# Patient Record
Sex: MALE | Race: WHITE | NOT HISPANIC OR LATINO | Employment: OTHER | URBAN - METROPOLITAN AREA
[De-identification: names, ages, dates, MRNs, and addresses within clinical notes are randomized per-mention and may not be internally consistent; named-entity substitution may affect disease eponyms.]

---

## 2018-11-06 ENCOUNTER — TELEPHONE (OUTPATIENT)
Dept: PULMONOLOGY | Facility: CLINIC | Age: 73
End: 2018-11-06

## 2018-11-28 RX ORDER — LOSARTAN POTASSIUM 100 MG/1
100 TABLET ORAL DAILY
COMMUNITY

## 2018-11-28 RX ORDER — ATORVASTATIN CALCIUM 80 MG/1
80 TABLET, FILM COATED ORAL DAILY
COMMUNITY

## 2018-11-28 RX ORDER — LOSARTAN POTASSIUM 50 MG/1
50 TABLET ORAL DAILY
COMMUNITY

## 2018-11-28 RX ORDER — FLUTICASONE PROPIONATE 50 MCG
1 SPRAY, SUSPENSION (ML) NASAL DAILY
COMMUNITY

## 2018-11-28 RX ORDER — FUROSEMIDE 40 MG/1
40 TABLET ORAL 2 TIMES DAILY
COMMUNITY
End: 2019-07-09

## 2018-11-28 RX ORDER — MELOXICAM 15 MG/1
15 TABLET ORAL DAILY
COMMUNITY
End: 2019-07-09

## 2018-11-28 RX ORDER — METOPROLOL SUCCINATE 50 MG/1
50 TABLET, EXTENDED RELEASE ORAL DAILY
COMMUNITY

## 2018-11-28 RX ORDER — ZOLPIDEM TARTRATE 10 MG/1
5 TABLET ORAL NIGHTLY PRN
COMMUNITY
End: 2019-07-09

## 2018-11-28 RX ORDER — AMLODIPINE BESYLATE 10 MG/1
10 TABLET ORAL DAILY
COMMUNITY

## 2018-11-28 RX ORDER — BUMETANIDE 1 MG/1
3 TABLET ORAL 2 TIMES DAILY
COMMUNITY

## 2018-11-28 RX ORDER — BUDESONIDE AND FORMOTEROL FUMARATE DIHYDRATE 160; 4.5 UG/1; UG/1
2 AEROSOL RESPIRATORY (INHALATION) EVERY 12 HOURS
COMMUNITY
End: 2023-09-19

## 2018-11-28 RX ORDER — NIFEDIPINE 60 MG/1
30 TABLET, EXTENDED RELEASE ORAL DAILY
COMMUNITY

## 2018-11-28 RX ORDER — POTASSIUM CHLORIDE 750 MG/1
10 TABLET, EXTENDED RELEASE ORAL 2 TIMES DAILY
COMMUNITY

## 2018-11-28 RX ORDER — CLOPIDOGREL BISULFATE 75 MG/1
75 TABLET ORAL DAILY
COMMUNITY

## 2018-11-28 RX ORDER — TERAZOSIN 2 MG/1
2 CAPSULE ORAL NIGHTLY
COMMUNITY

## 2018-11-29 ENCOUNTER — OFFICE VISIT (OUTPATIENT)
Dept: PULMONOLOGY | Facility: CLINIC | Age: 73
End: 2018-11-29
Payer: COMMERCIAL

## 2018-11-29 VITALS
SYSTOLIC BLOOD PRESSURE: 130 MMHG | OXYGEN SATURATION: 96 % | HEIGHT: 75 IN | BODY MASS INDEX: 39.17 KG/M2 | WEIGHT: 315 LBS | HEART RATE: 73 BPM | DIASTOLIC BLOOD PRESSURE: 82 MMHG

## 2018-11-29 DIAGNOSIS — G47.33 OSA (OBSTRUCTIVE SLEEP APNEA): ICD-10-CM

## 2018-11-29 DIAGNOSIS — J44.9 CHRONIC OBSTRUCTIVE PULMONARY DISEASE, UNSPECIFIED COPD TYPE: ICD-10-CM

## 2018-11-29 DIAGNOSIS — J96.11 CHRONIC HYPOXEMIC RESPIRATORY FAILURE: ICD-10-CM

## 2018-11-29 PROCEDURE — 99214 OFFICE O/P EST MOD 30 MIN: CPT | Mod: ,,, | Performed by: INTERNAL MEDICINE

## 2018-11-29 NOTE — PATIENT INSTRUCTIONS
Chronic Lung Disease: Preventing Lung Infections  Chronic lung diseases include chronic obstructive pulmonary disease (COPD), which includes chronic bronchitis and emphysema. Other chronic lung diseases include pulmonary fibrosis, sarcoidosis, and other conditions. When you have chronic lung diseases, it's very important to protect yourself from respiratory infections, like colds, the flu, and lung infections. Infections may cause your lung condition to worsen. Although you can't completely avoid them, there are things you can do to lessen the chance of infections.    Take precautions  Taking the following precautions can help you avoid illness:  · Remember to keep your hands away from your nose and mouth. Germs on your hands get into your respiratory system this way.  · Wash your hands often. When you wash them:  ¨ Use soap and warm water.  ¨ Rub your hands together well for at least 20 seconds.  ¨ Make sure to rinse them well.  ¨ Dry your hands on clean towels or air-dry them.  · Use hand  containing alcohol, if you are unable to wash your hands. Use the  after touching doorknobs, handles, and supermarket carts, for example, since lots of people touch them. Then wash your hands as soon as you can.  · To help prevent the flu, get a flu vaccination every year. This may be given at your healthcare provider's office, a drugstore, or pharmacy, or at work. Get your flu shot as soon as the vaccines are available in your area. This is usually around September each year.  · To help prevent pneumococcal pneumonia, get pneumonia vaccinations. Talk with your healthcare provider about which pneumococcal vaccinations you need.  · Try to stay away from people with respiratory infections, such as colds or the flu. Stay away from crowded places, like shopping centers or movie theatres during cold and flu season.  · If you smoke, think about quitting. In addition to causing or worsening many lung conditions, the  lung damage from smoking increases your risk of infections. Stay away from others who smoke, too. This is also harmful and increases your chance of infections.  Date Last Reviewed: 4/14/2016  © 8957-6390 [employer]. [address] [place] [address], [place] [address]. All rights reserved. This information is not intended as a substitute for professional medical care. Always follow your healthcare professional's instructions.      Continue to use your Symbicort two puffs twice a day and Combivent three times a day  Oxygen all the time  Bipap with sleep, bleed oxygen in   Call if you get sick  Already had his flu shot   Pulmonary rehab

## 2018-11-29 NOTE — LETTER
November 29, 2018      [facility]  [address]  [place] [address]           [facility] - Pulmonology  [address] [facility] [address] [place] [address]  Phone: [phone]  Fax: [phone]          Patient: Jordy Jeffers   MR Number: [id number]   YOB: 1945   Date of Visit: 11/29/2018       Dear [facility]:    Thank you for referring Jordy Jeffers to me for evaluation. Attached you will find relevant portions of my assessment and plan of care.    If you have questions, please do not hesitate to call me. I look forward to following Jordy Jeffers along with you.    Sincerely,    Martha Dee MD    Enclosure  CC:  No Recipients    If you would like to receive this communication electronically, please contact [email or web address] or [phone] to request more information on [employer] Link access.    For providers and/or their staff who would like to refer a patient to [facility], please contact us through our one-stop-shop provider referral line, [facility], at [phone].    If you feel you have received this communication in error or would no longer like to receive these types of communications, please e-mail [email or web address]

## 2018-11-29 NOTE — PROGRESS NOTES
SUBJECTIVE:    Patient ID: Jordy Jeffers is a 73 y.o. male.    Chief Complaint: Follow-up    HPI   Patient here today feeling well.  He was very ill in August with an infection. He was hospitalized for 16 days and required IV antibiotics for a month.  He developed an ulceration on his scrotum which has healed.  While he was in the hospital he was not able to use his own BIPAP at the hospital for some reason.  He is wearing his oxygen all the time.  He is using his Symbicort and Combivent.    Past Medical History:   Diagnosis Date    CAD (coronary artery disease)     Chronic hypoxemic respiratory failure     Colon polyps     COPD (chronic obstructive pulmonary disease)     Diastolic heart failure     Diverticulosis     DM (diabetes mellitus)     HTN (hypertension)     Hypercholesterolemia     Lung disease     Obesity     SYBIL (obstructive sleep apnea)      Past Surgical History:   Procedure Laterality Date    coronary artery stent      r cataract resected      warfin tumor removed        Family History   Problem Relation Age of Onset    Heart failure Mother     Coronary artery disease Father         Social History:   Marital Status:   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  Tobacco History:  reports that he quit smoking about 15 years ago. His smoking use included cigarettes. He has a 60.00 pack-year smoking history. he has never used smokeless tobacco.  Drug History:  reports that he does not use drugs.    Review of patient's allergies indicates:   Allergen Reactions    Metformin        Current Outpatient Medications   Medication Sig Dispense Refill    amLODIPine (NORVASC) 10 MG tablet Take 10 mg by mouth once daily.      atorvastatin (LIPITOR) 80 MG tablet Take 80 mg by mouth once daily.      budesonide-formoterol 160-4.5 mcg (SYMBICORT) 160-4.5 mcg/actuation HFAA Inhale 2 puffs into the lungs every 12 (twelve) hours. Controller      bumetanide  "(BUMEX) 1 MG tablet Take 3 mg by mouth 2 (two) times daily.       clopidogrel (PLAVIX) 75 mg tablet Take 75 mg by mouth once daily.      fluticasone (FLONASE) 50 mcg/actuation nasal spray 1 spray by Each Nare route once daily.      ipratropium-albuterol (COMBIVENT RESPIMAT)  mcg/actuation inhaler Inhale 1 puff into the lungs every 6 (six) hours as needed for Wheezing. Rescue      losartan (COZAAR) 100 MG tablet Take 100 mg by mouth once daily.      metoprolol succinate (TOPROL-XL) 200 MG 24 hr tablet Take 200 mg by mouth once daily.      NIFEdipine (ADALAT CC) 60 MG TbSR Take 30 mg by mouth once daily.      potassium chloride (KLOR-CON) 10 MEQ TbSR Take 10 mEq by mouth once.      terazosin (HYTRIN) 2 MG capsule Take 2 mg by mouth every evening.      furosemide (LASIX) 40 MG tablet Take 40 mg by mouth 2 (two) times daily.      losartan (COZAAR) 50 MG tablet Take 50 mg by mouth once daily.      meloxicam (MOBIC) 15 MG tablet Take 15 mg by mouth once daily.      zolpidem (AMBIEN) 10 mg Tab Take 5 mg by mouth nightly as needed.       No current facility-administered medications for this visit.        Last PFT: 12/28/2017  Last CT:    Review of Systems  General: Feeling Well.  Eyes: Vision is good.  ENT:  No sinusitis or pharyngitis.   Heart:: No chest pain or palpitations.  Lungs: breathing is stable   GI: No Nausea, vomiting, constipation, diarrhea, or reflux.  : No dysuria, hesitancy, or nocturia.  Musculoskeletal: No joint pain or myalgias.  Skin: No lesions or rashes.  Neuro: No headaches or neuropathy.  Lymph: swelling to legs  Psych: No anxiety or depression.  Endo: weight loss     OBJECTIVE:      /82 (BP Location: Left arm, Patient Position: Sitting)   Pulse 73   Ht 6' 3" (1.905 m)   Wt (!) 147 kg (324 lb)   SpO2 96% Comment: 4.0 oxygen level  BMI 40.50 kg/m²     Physical Exam  GENERAL: Older patient in no distress.  HEENT: Pupils equal and reactive. Extraocular movements intact. " "Nose intact.      Pharynx moist.  NECK: Supple.   HEART: Regular rate and rhythm. No murmur or gallop auscultated.  LUNGS: Clear to auscultation and percussion. Lung excursion symmetrical. No change in fremitus. No adventitial noises.  ABDOMEN: Bowel sounds present. Non-tender, no masses palpated.  EXTREMITIES: Normal muscle tone and joint movement, no cyanosis or clubbing.   LYMPHATICS: No adenopathy palpated, no edema.  SKIN: Dry, intact, no lesions.   NEURO: Cranial nerves II-XII intact. Motor strength 5/5 bilaterally, upper and lower extremities.  PSYCH: Appropriate affect.    Majo Denson NP served in the capacity as a "scribe" for this patient encounter.  A "face to face" encounter occurred with Dr. Dee on this date  The treatment plan and medical decision making is outlined below:         Assessment:       1. Chronic obstructive pulmonary disease, unspecified COPD type    2. SYBIL (obstructive sleep apnea)    3. Chronic hypoxemic respiratory failure          Plan:       Chronic obstructive pulmonary disease, unspecified COPD type  -     Ambulatory Referral to Pulmonary Rehab    SYBIL (obstructive sleep apnea)    Chronic hypoxemic respiratory failure    Continue to use your Symbicort two puffs twice a day and Combivent three times a day  Oxygen all the time  Bipap with sleep, bleed oxygen in   Call if you get sick  Already had his flu shot   Pulmonary rehab      Follow-up in about 6 months (around 5/29/2019).        "

## 2019-07-08 RX ORDER — CIPROFLOXACIN AND DEXAMETHASONE 3; 1 MG/ML; MG/ML
SUSPENSION/ DROPS AURICULAR (OTIC)
COMMUNITY
End: 2019-07-09

## 2019-07-08 RX ORDER — AMMONIUM LACTATE 12 G/100G
LOTION TOPICAL
COMMUNITY

## 2019-07-08 RX ORDER — BENZONATATE 100 MG/1
100 CAPSULE ORAL
COMMUNITY
End: 2021-06-30

## 2019-07-08 RX ORDER — AMOXICILLIN AND CLAVULANATE POTASSIUM 875; 125 MG/1; MG/1
TABLET, FILM COATED ORAL
COMMUNITY
End: 2019-07-09

## 2019-07-08 RX ORDER — ONDANSETRON 4 MG/1
4 TABLET, ORALLY DISINTEGRATING ORAL
COMMUNITY
Start: 2018-08-02 | End: 2019-07-09

## 2019-07-08 RX ORDER — INSULIN ASPART 100 [IU]/ML
10 INJECTION, SOLUTION INTRAVENOUS; SUBCUTANEOUS
COMMUNITY

## 2019-07-09 ENCOUNTER — TELEPHONE (OUTPATIENT)
Dept: PULMONOLOGY | Facility: CLINIC | Age: 74
End: 2019-07-09

## 2019-07-09 ENCOUNTER — OFFICE VISIT (OUTPATIENT)
Dept: PULMONOLOGY | Facility: CLINIC | Age: 74
End: 2019-07-09
Payer: COMMERCIAL

## 2019-07-09 VITALS
WEIGHT: 315 LBS | DIASTOLIC BLOOD PRESSURE: 60 MMHG | HEIGHT: 75 IN | SYSTOLIC BLOOD PRESSURE: 130 MMHG | BODY MASS INDEX: 39.17 KG/M2 | OXYGEN SATURATION: 95 % | HEART RATE: 88 BPM

## 2019-07-09 DIAGNOSIS — J44.9 CHRONIC OBSTRUCTIVE PULMONARY DISEASE, UNSPECIFIED COPD TYPE: Primary | ICD-10-CM

## 2019-07-09 DIAGNOSIS — J96.11 CHRONIC HYPOXEMIC RESPIRATORY FAILURE: ICD-10-CM

## 2019-07-09 DIAGNOSIS — G47.33 OSA (OBSTRUCTIVE SLEEP APNEA): ICD-10-CM

## 2019-07-09 PROCEDURE — 99214 OFFICE O/P EST MOD 30 MIN: CPT | Mod: ,,, | Performed by: NURSE PRACTITIONER

## 2019-07-09 PROCEDURE — 99214 PR OFFICE/OUTPT VISIT, EST, LEVL IV, 30-39 MIN: ICD-10-PCS | Mod: ,,, | Performed by: NURSE PRACTITIONER

## 2019-07-09 RX ORDER — SPIRONOLACTONE 25 MG/1
25 TABLET ORAL
COMMUNITY

## 2019-07-09 RX ORDER — PANTOPRAZOLE SODIUM 40 MG/1
40 TABLET, DELAYED RELEASE ORAL DAILY
COMMUNITY

## 2019-07-09 NOTE — PROGRESS NOTES
SUBJECTIVE:    Patient ID: Jordy Jeffers is a 74 y.o. male.    Chief Complaint: COPD    HPI   Patient here today with more weight gain.  He gets short of breath easily. He is using Symbicort twice a day and Combivent 3-4 times a day. [facility] not [facility] more then twice a day.  He wears his oxygen all the time. He sleeps on his BIPAP with oxygen bled in. He does not want to do pulmonary rehab.   Past Medical History:   Diagnosis Date    CAD (coronary artery disease)     Chronic hypoxemic respiratory failure     Colon polyps     COPD (chronic obstructive pulmonary disease)     Diastolic heart failure     Diverticulosis     DM (diabetes mellitus)     HTN (hypertension)     Hypercholesterolemia     Lung disease     Obesity     SYBIL (obstructive sleep apnea)      Past Surgical History:   Procedure Laterality Date    coronary artery stent      r cataract resected      warfin tumor removed        Family History   Problem Relation Age of Onset    Heart failure Mother     Coronary artery disease Father         Social History:   Marital Status:   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  Tobacco History:  reports that he quit smoking about 16 years ago. His smoking use included cigarettes. He has a 60.00 pack-year smoking history. He has never used smokeless tobacco.  Drug History:  reports that he does not use drugs.    Review of patient's allergies indicates:   Allergen Reactions    Metformin        Current Outpatient Medications   Medication Sig Dispense Refill    amLODIPine (NORVASC) 10 MG tablet Take 10 mg by mouth once daily.      atorvastatin (LIPITOR) 80 MG tablet Take 80 mg by mouth once daily.      benzonatate (TESSALON) 100 MG capsule Take 100 mg by mouth.      budesonide-formoterol 160-4.5 mcg (SYMBICORT) 160-4.5 mcg/actuation HFAA Inhale 2 puffs into the lungs every 12 (twelve) hours. Controller      bumetanide (BUMEX) 1 MG  "tablet Take 3 mg by mouth 2 (two) times daily.       clopidogrel (PLAVIX) 75 mg tablet Take 75 mg by mouth once daily.      fluticasone (FLONASE) 50 mcg/actuation nasal spray 1 spray by Each Nare route once daily.      insulin aspart U-100 (NOVOLOG) 100 unit/mL Crtg Inject 10 Units into the skin.      ipratropium-albuterol (COMBIVENT RESPIMAT)  mcg/actuation inhaler Inhale 1 puff into the lungs every 6 (six) hours as needed for Wheezing. Rescue      losartan (COZAAR) 50 MG tablet Take 50 mg by mouth once daily.      metoprolol succinate (TOPROL-XL) 200 MG 24 hr tablet Take 200 mg by mouth once daily.      pantoprazole (PROTONIX) 40 MG tablet Take 40 mg by mouth once daily.      potassium chloride (KLOR-CON) 10 MEQ TbSR Take 10 mEq by mouth once.      spironolactone (ALDACTONE) 25 MG tablet Take 25 mg by mouth.      terazosin (HYTRIN) 2 MG capsule Take 2 mg by mouth every evening.      ammonium lactate (LAC-HYDRIN) 12 % lotion by intratympanic route.      losartan (COZAAR) 100 MG tablet Take 100 mg by mouth once daily.      NIFEdipine (ADALAT CC) 60 MG TbSR Take 30 mg by mouth once daily.       No current facility-administered medications for this visit.        Last PFT: 12/28/2017      Review of Systems  General: good and bad days  Eyes: Vision is good.  ENT:  No sinusitis or pharyngitis.   Heart:: No chest pain or palpitations.  Lungs: gets more short of breath with heat,   GI: No Nausea, vomiting, constipation, diarrhea, or reflux.  : No dysuria, hesitancy, or nocturia.  Musculoskeletal: No joint pain or myalgias.  Skin: No lesions or rashes.  Neuro: No headaches or neuropathy.  Lymph: swelling to legs  Psych: No anxiety or depression.  Endo: weight gain     OBJECTIVE:      /60 (BP Location: Left arm, Patient Position: Sitting, BP Method: Large (Manual))   Pulse 88   Ht 6' 3" (1.905 m)   Wt (!) 158.8 kg (350 lb)   SpO2 95% Comment: 4LPM  BMI 43.75 kg/m²     Physical Exam  GENERAL: " Older patient in no distress.  HEENT: Pupils equal and reactive. Extraocular movements intact. Nose intact.      Pharynx moist.  NECK: Supple.   HEART: Regular rate and rhythm. No murmur or gallop auscultated.  LUNGS: Clear to auscultation and percussion. Lung excursion symmetrical. No change in fremitus. No adventitial noises.  ABDOMEN: Bowel sounds present. Non-tender, no masses palpated.  EXTREMITIES: Normal muscle tone and joint movement, no cyanosis or clubbing.   LYMPHATICS: 2+ pitting edema to legs   SKIN: Dry, intact, no lesions.   NEURO: Cranial nerves II-XII intact. Motor strength 5/5 bilaterally, upper and lower extremities.  PSYCH: Appropriate affect.        Assessment:       1. Chronic obstructive pulmonary disease, unspecified COPD type    2. Chronic hypoxemic respiratory failure    3. SYBIL (obstructive sleep apnea)          Plan:       Chronic obstructive pulmonary disease, unspecified COPD type  -     Complete PFT with bronchodilator    Chronic hypoxemic respiratory failure    SYBIL (obstructive sleep apnea)       Continue to use your Symbicort two puffs twice a day, rinse after you use  Start Spiriva 1 capsule daily  Proventil 2 puffs as needed every 4 hours for shortness of breath  Stop the Combivent   Oxygen all the time  Bipap with sleep, bleed oxygen in   Call if you get sick  PFT       Follow up in about 6 months (around 1/9/2020).

## 2019-07-09 NOTE — PATIENT INSTRUCTIONS
Treatment for COPD    Your healthcare provider will prescribe the best treatments for your COPD.  Treatment  Recommendations include the following:  · Medicines. Some medicines help relieve symptoms when you have them. Others are taken daily to control inflammation in the lungs. Always take your medicines as prescribed. Learn the names of your medicines, as well as how and when to use them.  · Oxygen therapy. Oxygen may be prescribed if tests show that your blood contains too little oxygen.  · Smoking. If you smoke, quit. Smoking is the main cause of COPD. Quitting will help you be able to better manage your COPD. Ask your healthcare provider about ways to help you quit smoking.  · Avoiding infections. Infections, like a cold or the flu, can cause your symptoms to worsen. Try to stay away from people who are sick. Wash your hands often. And, ask your healthcare provider about vaccines for the flu and pneumonia.  Coping with shortness of breath  Coping tips include the following:  · Exercise. Try to be as active as possible. This will improve energy levels and strengthen your muscles, so you can do more.  · Breathing techniques. Ask your healthcare provider or nurse to show you how to do pursed-lip breathing.  · Balance rest and activity. Each day, try to balance rest periods with activity. For example, you might start the day with getting dressed and eating breakfast, then relax and read the paper. After that, take a brief walk. And then sit with your feet up for a while.  · Pulmonary rehabilitation. Ask your provider, or call your local hospital to find out about pulmonary rehab programs. The programs help with managing your disease, breathing techniques, exercise, support and counseling.  · Healthy eating. Eating a healthy, balanced diet and making an effort to maintain your ideal weight are important to staying as healthy as possible. Make sure you have a lot of fruit and vegetables every day, as well as  balanced portions of whole grains, lean meats and fish, and low-fat dairy products.  Date Last Reviewed: 5/1/2016  © 7019-0944 [employer]. [address] [place] [address], [place] [address]. All rights reserved. This information is not intended as a substitute for professional medical care. Always follow your healthcare professional's instructions.    Continue to use your Symbicort two puffs twice a day   Start Spiriva 1 capsule daily  Proventil 2 puffs as needed every 4 hours for shortness of breath  Stop the Combivent   Oxygen all the time  Bipap with sleep, bleed oxygen in   Call if you get sick  PFT

## 2019-07-09 NOTE — TELEPHONE ENCOUNTER
----- Message from LAY Caba sent at 7/8/2019  4:52 PM CDT -----  Next available for new referral   ----- Message -----  From: Wilver Roth MA  Sent: 7/8/2019   3:50 PM  To: LAY Caba        ----- Message -----  From: Sandi Szymanski  Sent: 7/8/2019   2:50 PM  To: Janiya Kasper Staff    [facility] CONSULT TRANSMITTAL PULMONARY, 7/8/19

## 2019-07-09 NOTE — TELEPHONE ENCOUNTER
Patient is not a new patient. He has an appointment today, July 9, 2019. This is just his [facility] referral

## 2019-07-17 ENCOUNTER — TELEPHONE (OUTPATIENT)
Dept: PULMONOLOGY | Facility: CLINIC | Age: 74
End: 2019-07-17

## 2019-07-17 DIAGNOSIS — R06.00 DYSPNEA, UNSPECIFIED TYPE: Primary | ICD-10-CM

## 2019-07-17 NOTE — TELEPHONE ENCOUNTER
PFT's show severe obstruction, moderate restriction, severe diffusion defect. Good response in small airway.  PFTs are worse.

## 2019-07-18 NOTE — TELEPHONE ENCOUNTER
I spoke with patient and his wife, they are aware of results.  Will order chest xray to see if anything besides his weight is contributing to restriction.  He is using the symbicort and [facility] I [facility].  I also wrote for Tessalon for the patient to have just in case

## 2019-07-23 ENCOUNTER — DOCUMENTATION ONLY (OUTPATIENT)
Dept: PULMONOLOGY | Facility: CLINIC | Age: 74
End: 2019-07-23

## 2019-07-23 NOTE — PROGRESS NOTES
Chest x-ray done after last visit was unremarkable with the exception of some basilar atelectasis and scarring

## 2019-07-27 ENCOUNTER — TELEPHONE (OUTPATIENT)
Dept: PULMONOLOGY | Facility: CLINIC | Age: 74
End: 2019-07-27

## 2020-01-14 ENCOUNTER — OFFICE VISIT (OUTPATIENT)
Dept: PULMONOLOGY | Facility: CLINIC | Age: 75
End: 2020-01-14
Payer: COMMERCIAL

## 2020-01-14 VITALS
DIASTOLIC BLOOD PRESSURE: 80 MMHG | SYSTOLIC BLOOD PRESSURE: 140 MMHG | HEART RATE: 80 BPM | BODY MASS INDEX: 44 KG/M2 | OXYGEN SATURATION: 92 % | WEIGHT: 315 LBS

## 2020-01-14 DIAGNOSIS — J98.11 ATELECTASIS: ICD-10-CM

## 2020-01-14 DIAGNOSIS — J96.11 CHRONIC HYPOXEMIC RESPIRATORY FAILURE: ICD-10-CM

## 2020-01-14 DIAGNOSIS — J44.9 CHRONIC OBSTRUCTIVE PULMONARY DISEASE, UNSPECIFIED COPD TYPE: Primary | ICD-10-CM

## 2020-01-14 DIAGNOSIS — G47.33 OSA (OBSTRUCTIVE SLEEP APNEA): ICD-10-CM

## 2020-01-14 PROCEDURE — 99214 PR OFFICE/OUTPT VISIT, EST, LEVL IV, 30-39 MIN: ICD-10-PCS | Mod: S$GLB,,, | Performed by: NURSE PRACTITIONER

## 2020-01-14 PROCEDURE — 99214 OFFICE O/P EST MOD 30 MIN: CPT | Mod: S$GLB,,, | Performed by: NURSE PRACTITIONER

## 2020-01-14 PROCEDURE — 1159F MED LIST DOCD IN RCRD: CPT | Mod: S$GLB,,, | Performed by: NURSE PRACTITIONER

## 2020-01-14 PROCEDURE — 1126F AMNT PAIN NOTED NONE PRSNT: CPT | Mod: S$GLB,,, | Performed by: NURSE PRACTITIONER

## 2020-01-14 PROCEDURE — 1126F PR PAIN SEVERITY QUANTIFIED, NO PAIN PRESENT: ICD-10-PCS | Mod: S$GLB,,, | Performed by: NURSE PRACTITIONER

## 2020-01-14 PROCEDURE — 1159F PR MEDICATION LIST DOCUMENTED IN MEDICAL RECORD: ICD-10-PCS | Mod: S$GLB,,, | Performed by: NURSE PRACTITIONER

## 2020-01-14 RX ORDER — TIOTROPIUM BROMIDE 18 UG/1
1 CAPSULE ORAL; RESPIRATORY (INHALATION) DAILY
Qty: 90 CAPSULE | Refills: 6 | Status: SHIPPED | OUTPATIENT
Start: 2020-01-14 | End: 2021-01-13

## 2020-01-14 NOTE — LETTER
January 14, 2020      [facility]  [address] [facility] [address]           [facility] - Pulmonology  [address]  [facility] [address]  Phone: [phone]  Fax: [phone]          Patient: Jordy Jeffers   MR Number: [id number]   YOB: 1945   Date of Visit: 1/14/2020       Dear [facility]:    Thank you for referring Jordy Jeffers to me for evaluation. Attached you will find relevant portions of my assessment and plan of care.    If you have questions, please do not hesitate to call me. I look forward to following Jordy Jeffers along with you.    Sincerely,    Majo Denson, [facility]    Enclosure  CC:  No Recipients    If you would like to receive this communication electronically, please contact [email or web address] or [phone] to request more information on [employer] Link access.    For providers and/or their staff who would like to refer a patient to [facility], please contact us through our one-stop-shop provider referral line, [facility], at [phone].    If you feel you have received this communication in error or would no longer like to receive these types of communications, please e-mail [email or web address]

## 2020-01-14 NOTE — PATIENT INSTRUCTIONS
Treatment for COPD    Your healthcare provider will prescribe the best treatments for your COPD.  Treatment  Recommendations include the following:  · Medicines. Some medicines help relieve symptoms when you have them. Others are taken daily to control inflammation in the lungs. Always take your medicines as prescribed. Learn the names of your medicines, as well as how and when to use them.  · Oxygen therapy. Oxygen may be prescribed if tests show that your blood contains too little oxygen.  · Smoking. If you smoke, quit. Smoking is the main cause of COPD. Quitting will help you be able to better manage your COPD. Ask your healthcare provider about ways to help you quit smoking.  · Avoiding infections. Infections, like a cold or the flu, can cause your symptoms to worsen. Try to stay away from people who are sick. Wash your hands often. And, ask your healthcare provider about vaccines for the flu and pneumonia.  Coping with shortness of breath  Coping tips include the following:  · Exercise. Try to be as active as possible. This will improve energy levels and strengthen your muscles, so you can do more.  · Breathing techniques. Ask your healthcare provider or nurse to show you how to do pursed-lip breathing.  · Balance rest and activity. Each day, try to balance rest periods with activity. For example, you might start the day with getting dressed and eating breakfast, then relax and read the paper. After that, take a brief walk. And then sit with your feet up for a while.  · Pulmonary rehabilitation. Ask your provider, or call your local hospital to find out about pulmonary rehab programs. The programs help with managing your disease, breathing techniques, exercise, support and counseling.  · Healthy eating. Eating a healthy, balanced diet and making an effort to maintain your ideal weight are important to staying as healthy as possible. Make sure you have a lot of fruit and vegetables every day, as well as  balanced portions of whole grains, lean meats and fish, and low-fat dairy products.  Date Last Reviewed: 5/1/2016  © 2916-5570 [employer], [employer]. [address] [place] [address], [place] [address]. All rights reserved. This information is not intended as a substitute for professional medical care. Always follow your healthcare professional's instructions.

## 2020-01-14 NOTE — PROGRESS NOTES
SUBJECTIVE:    Patient ID: Jordy Jeffers is a 74 y.o. male.    Chief Complaint: COPD (6 mon check up )    Patient here today with no weight loss. He is not interested in pulmonary rehab.  He is using Symbicort and Spiriva.  He is sleeping on his BIPAP with oxygen bled in.  [facility].    Past Medical History:   Diagnosis Date    CAD (coronary artery disease)     Chronic hypoxemic respiratory failure     Colon polyps     COPD (chronic obstructive pulmonary disease)     Diastolic heart failure     Diverticulosis     DM (diabetes mellitus)     HTN (hypertension)     Hypercholesterolemia     Lung disease     Obesity     SYBIL (obstructive sleep apnea)      Past Surgical History:   Procedure Laterality Date    coronary artery stent      r cataract resected      warfin tumor removed        Family History   Problem Relation Age of Onset    Heart failure Mother     Coronary artery disease Father         Social History:   Marital Status:   Occupation: retired [employer] [place]  Alcohol History:  reports that he drinks alcohol.  Tobacco History:  reports that he quit smoking about 17 years ago. His smoking use included cigarettes. He has a 60.00 pack-year smoking history. He has never used smokeless tobacco.  Drug History:  reports that he does not use drugs.    Review of patient's allergies indicates:   Allergen Reactions    Metformin        Current Outpatient Medications   Medication Sig Dispense Refill    amLODIPine (NORVASC) 10 MG tablet Take 10 mg by mouth once daily.      ammonium lactate (LAC-HYDRIN) 12 % lotion by intratympanic route.      atorvastatin (LIPITOR) 80 MG tablet Take 80 mg by mouth once daily.      benzonatate (TESSALON) 100 MG capsule Take 100 mg by mouth.      budesonide-formoterol 160-4.5 mcg (SYMBICORT) 160-4.5 mcg/actuation HFAA Inhale 2 puffs into the lungs every 12 (twelve) hours. Controller      bumetanide (BUMEX) 1 MG tablet Take 3 mg by mouth 2 (two) times daily.        clopidogrel (PLAVIX) 75 mg tablet Take 75 mg by mouth once daily.      fluticasone (FLONASE) 50 mcg/actuation nasal spray 1 spray by Each Nare route once daily.      insulin aspart U-100 (NOVOLOG) 100 unit/mL Crtg Inject 10 Units into the skin.      ipratropium-albuterol (COMBIVENT RESPIMAT)  mcg/actuation inhaler Inhale 1 puff into the lungs every 6 (six) hours as needed for Wheezing. Rescue      losartan (COZAAR) 100 MG tablet Take 100 mg by mouth once daily.      metoprolol succinate (TOPROL-XL) 200 MG 24 hr tablet Take 200 mg by mouth once daily.      pantoprazole (PROTONIX) 40 MG tablet Take 40 mg by mouth once daily.      potassium chloride (KLOR-CON) 10 MEQ TbSR Take 10 mEq by mouth once.      spironolactone (ALDACTONE) 25 MG tablet Take 25 mg by mouth.      terazosin (HYTRIN) 2 MG capsule Take 2 mg by mouth every evening.      losartan (COZAAR) 50 MG tablet Take 50 mg by mouth once daily.      NIFEdipine (ADALAT CC) 60 MG TbSR Take 30 mg by mouth once daily.       No current facility-administered medications for this visit.        Last PFT: 7/2019 severe obstruction with good response, moderate restriction, severe diffusion defect.   Chest xray 7/2019 IMPRESSION:  Pulmonary hyperinflation.  Linear opacities compatible with atelectasis or scarring in the lung bases.  Arteriosclerosis    Review of Systems  General: good and bad days  Eyes: Vision is good.  ENT:  No sinusitis or pharyngitis.   Heart:: No chest pain or palpitations.  Lungs: breathing is stable   GI: No Nausea, vomiting, constipation, diarrhea, or reflux.  : No dysuria, hesitancy, or nocturia.  Musculoskeletal: No joint pain or myalgias.  Skin: No lesions or rashes.  Neuro: No headaches or neuropathy.  Lymph: swelling to legs  Psych: No anxiety or depression.  Endo: weight is stable     OBJECTIVE:      BP (!) 140/80 (BP Location: Left arm, Patient Position: Sitting)   Pulse 80   Wt (!) 159.7 kg (352 lb)   SpO2 (!) 92%  Comment: 4.0 oxygen level  BMI 44.00 kg/m²     Physical Exam  GENERAL: Older patient in no distress.  HEENT: Pupils equal and reactive. Extraocular movements intact. Nose intact.      Pharynx moist.  NECK: Supple.   HEART: Regular rate and rhythm. No murmur or gallop auscultated.  LUNGS: Clear but decreased to auscultation and percussion. Lung excursion symmetrical. No change in fremitus. No adventitial noises.  ABDOMEN: Bowel sounds present. Non-tender, no masses palpated.  EXTREMITIES: Normal muscle tone and joint movement, no cyanosis or clubbing.   LYMPHATICS: 2+ pitting edema to legs   SKIN: Dry, intact, no lesions.   NEURO: Cranial nerves II-XII intact. Motor strength 5/5 bilaterally, upper and lower extremities.  PSYCH: Appropriate affect.        Assessment:       1. Chronic obstructive pulmonary disease, unspecified COPD type    2. Chronic hypoxemic respiratory failure    3. SYBIL (obstructive sleep apnea)    4. Atelectasis          Plan:       Chronic obstructive pulmonary disease, unspecified COPD type    Chronic hypoxemic respiratory failure    SYBIL (obstructive sleep apnea)    Atelectasis       Continue to use your Symbicort two puffs twice a day, rinse after you use  Continue  Spiriva 1 capsule daily  Oxygen all the time  Bipap with sleep, bleed oxygen in   Bring a download from machine for Dr. Dee to look at.   Already had flu shot  Consider pulmonary rehab!!     Follow up in about 6 months (around 7/14/2020).

## 2020-03-11 ENCOUNTER — TELEPHONE (OUTPATIENT)
Dept: PULMONOLOGY | Facility: CLINIC | Age: 75
End: 2020-03-11

## 2020-03-11 RX ORDER — BUDESONIDE AND FORMOTEROL FUMARATE DIHYDRATE 160; 4.5 UG/1; UG/1
2 AEROSOL RESPIRATORY (INHALATION) EVERY 12 HOURS
Qty: 3 INHALER | Refills: 6 | Status: SHIPPED | OUTPATIENT
Start: 2020-03-11 | End: 2021-03-11

## 2020-03-11 NOTE — TELEPHONE ENCOUNTER
----- Message from Candy Kenny sent at 3/11/2020 11:43 AM CDT -----  Patient needs written Rx for budesonide-formoterol 160-4.5 mcg (SYMBICORT) 160-4.5 mcg/actuation HFAA. [facility] day supply written on the Rx.  Will call patient when written Rx is ready for .      Thank You      Candy

## 2020-03-17 ENCOUNTER — TELEPHONE (OUTPATIENT)
Dept: PULMONOLOGY | Facility: CLINIC | Age: 75
End: 2020-03-17

## 2020-07-23 ENCOUNTER — TELEPHONE (OUTPATIENT)
Dept: PULMONOLOGY | Facility: CLINIC | Age: 75
End: 2020-07-23

## 2020-07-23 NOTE — TELEPHONE ENCOUNTER
Pts wife called requesting a new rx written for Albuterol 90mcg HFA that she can  and [facility].  His other rx .

## 2020-09-08 ENCOUNTER — OFFICE VISIT (OUTPATIENT)
Dept: PULMONOLOGY | Facility: CLINIC | Age: 75
End: 2020-09-08
Payer: OTHER GOVERNMENT

## 2020-09-08 VITALS
SYSTOLIC BLOOD PRESSURE: 120 MMHG | OXYGEN SATURATION: 94 % | BODY MASS INDEX: 39.17 KG/M2 | DIASTOLIC BLOOD PRESSURE: 52 MMHG | TEMPERATURE: 98 F | HEART RATE: 78 BPM | HEIGHT: 75 IN | WEIGHT: 315 LBS

## 2020-09-08 DIAGNOSIS — J96.11 CHRONIC HYPOXEMIC RESPIRATORY FAILURE: ICD-10-CM

## 2020-09-08 DIAGNOSIS — J44.9 CHRONIC OBSTRUCTIVE PULMONARY DISEASE, UNSPECIFIED COPD TYPE: Primary | ICD-10-CM

## 2020-09-08 DIAGNOSIS — R09.82 POST-NASAL DRIP: ICD-10-CM

## 2020-09-08 DIAGNOSIS — K21.9 GASTROESOPHAGEAL REFLUX DISEASE, ESOPHAGITIS PRESENCE NOT SPECIFIED: ICD-10-CM

## 2020-09-08 DIAGNOSIS — G47.33 OSA (OBSTRUCTIVE SLEEP APNEA): ICD-10-CM

## 2020-09-08 PROCEDURE — 99214 OFFICE O/P EST MOD 30 MIN: CPT | Mod: S$GLB,,, | Performed by: NURSE PRACTITIONER

## 2020-09-08 PROCEDURE — 99214 PR OFFICE/OUTPT VISIT, EST, LEVL IV, 30-39 MIN: ICD-10-PCS | Mod: S$GLB,,, | Performed by: NURSE PRACTITIONER

## 2020-09-08 RX ORDER — ASPIRIN 81 MG/1
81 TABLET ORAL DAILY
Status: ON HOLD | COMMUNITY
End: 2023-03-17

## 2020-09-08 NOTE — LETTER
September 8, 2020      [facility]  [address] [facility] [address]           [facility] - Pulmonology  [address]  [facility] [address]  Phone: [phone]  Fax: [phone]          Patient: Jordy Jeffers   MR Number: [id number]   YOB: 1945   Date of Visit: 9/8/2020       Dear [facility]:    Thank you for referring Jordy Jeffers to me for evaluation. Attached you will find relevant portions of my assessment and plan of care.    If you have questions, please do not hesitate to call me. I look forward to following Jordy Jeffers along with you.    Sincerely,    Majo Denson, [facility]    Enclosure  CC:  No Recipients    If you would like to receive this communication electronically, please contact [email or web address] or [phone] to request more information on ezeep Link access.    For providers and/or their staff who would like to refer a patient to [facility], please contact us through our one-stop-shop provider referral line, [facility], at [phone].    If you feel you have received this communication in error or would no longer like to receive these types of communications, please e-mail [email or web address]

## 2020-09-08 NOTE — PATIENT INSTRUCTIONS
What is COPD?  COPD stands for chronic obstructive pulmonary disease. It means the airways in your lungs are blocked (obstructed). Because of this, it is hard to breathe. You may have trouble with daily activities because of shortness of breath. Over time the shortness of breath usually worsens making it more and more difficult to take care of yourself and take part in activities. Chronic bronchitis and emphysema are two common types of COPD.  What happens in chronic bronchitis?    The cells in the airways make more mucus than normal. The mucus builds up, narrowing the airways. This means less air travels into and out of the lungs. The lining of the airways may also become inflamed (swollen) and causes the airways to narrow even more.        What happens in emphysema?    The small airways are damaged and lose their stretchiness. The airways collapse when you exhale, causing air to get trapped in the air sacs. This means that less oxygen enters the blood vessels and less oxygen is delivered to all of the cells of your body. This makes it hard to breathe.     Damage to cilia    Cilia are small hairs that line and protect the airways. Smoking damages the cilia. Damaged cilia cant sweep mucus and particles away. Some of the cilia are destroyed. This damage worsens COPD.  How did I get COPD?  Most people get COPD from smoking. Cigarette smoke damages lungs, which can develop into COPD over many years.  How COPD affects you  COPD makes you work harder to breathe. Air may get trapped in the lungs, which prevents your lungs from filling completely with fresh oxygen-filled air when you inhale (breathe in). It's harder to take deep breaths especially when you are active and start breathing faster. Over time, your lungs may become enlarged filling the lung with air that does not transfer oxygen into the blood. These problems cause you to have shortness of breath (also called dyspnea). Wheezing (hoarse, whistling breathing),  chronic cough, and fatigue (feeling tired and worn out) are also common.   Date Last Reviewed: 5/1/2016  © 6180-9960 [employer]. [address] [place] [address], [place] [address]. All rights reserved. This information is not intended as a substitute for professional medical care. Always follow your healthcare professional's instructions.    Continue to use your Symbicort two puffs twice a day, rinse after you use  Continue  Spiriva 1 capsule daily  Oxygen all the time  Bipap with sleep, bleed oxygen in    allegra 180mg daily  Flonase 2 puffs to each nostril daily  Keep taking your reflux medication   Flu shot next month

## 2020-09-08 NOTE — PROGRESS NOTES
SUBJECTIVE:    Patient ID: Jordy Jeffers is a 75 y.o. male.    Chief Complaint: COPD    Patient here today feeling well.  He is using Symbicort and Spiriva respimat. He uses Combivent as needed.    He is sleeping on his BIPAP with oxygen bled in.  He is wearing his oxygen all the time. He has not lost weight.   Past Medical History:   Diagnosis Date    CAD (coronary artery disease)     Chronic hypoxemic respiratory failure     Colon polyps     COPD (chronic obstructive pulmonary disease)     Diastolic heart failure     Diverticulosis     DM (diabetes mellitus)     HTN (hypertension)     Hypercholesterolemia     Lung disease     Obesity     SYBIL (obstructive sleep apnea)      Past Surgical History:   Procedure Laterality Date    coronary artery stent      r cataract resected      warfin tumor removed        Family History   Problem Relation Age of Onset    Heart failure Mother     Coronary artery disease Father         Social History:   Marital Status:   [place]up[place] [employer] [place]  Alcohol History:  reports current alcohol use.  Tobacco History:  reports that he quit smoking about 17 years ago. His smoking use included cigarettes. He has a 60.00 pack-year smoking history. He has never used smokeless tobacco.  Drug History:  reports no history of drug use.    Review of patient's allergies indicates:   Allergen Reactions    Metformin        Current Outpatient Medications   Medication Sig Dispense Refill    amLODIPine (NORVASC) 10 MG tablet Take 10 mg by mouth once daily.      ammonium lactate (LAC-HYDRIN) 12 % lotion by intratympanic route.      aspirin (ECOTRIN) 81 MG EC tablet Take 81 mg by mouth once daily.      atorvastatin (LIPITOR) 80 MG tablet Take 80 mg by mouth once daily.      benzonatate (TESSALON) 100 MG capsule Take 100 mg by mouth.      budesonide-formoterol 160-4.5 mcg (SYMBICORT) 160-4.5 mcg/actuation HFAA Inhale 2 puffs into the lungs every 12 (twelve) hours. Controller       budesonide-formoterol 160-4.5 mcg (SYMBICORT) 160-4.5 mcg/actuation HFAA Inhale 2 puffs into the lungs every 12 (twelve) hours. Controller 3 Inhaler 6    bumetanide (BUMEX) 1 MG tablet Take 3 mg by mouth 2 (two) times daily.       clopidogrel (PLAVIX) 75 mg tablet Take 75 mg by mouth once daily.      fluticasone (FLONASE) 50 mcg/actuation nasal spray 1 spray by Each Nare route once daily.      insulin aspart U-100 (NOVOLOG) 100 unit/mL Crtg Inject 10 Units into the skin.      ipratropium-albuterol (COMBIVENT RESPIMAT)  mcg/actuation inhaler Inhale 1 puff into the lungs every 6 (six) hours as needed for Wheezing. Rescue      losartan (COZAAR) 100 MG tablet Take 100 mg by mouth once daily.      metoprolol succinate (TOPROL-XL) 100 MG 24 hr tablet Take 100 mg by mouth once daily.       pantoprazole (PROTONIX) 40 MG tablet Take 40 mg by mouth once daily.      potassium chloride (KLOR-CON) 10 MEQ TbSR Take 10 mEq by mouth 2 (two) times daily.       spironolactone (ALDACTONE) 25 MG tablet Take 25 mg by mouth.      terazosin (HYTRIN) 2 MG capsule Take 2 mg by mouth every evening.      tiotropium bromide (SPIRIVA RESPIMAT) 2.5 mcg/actuation Mist Inhale 2 puffs into the lungs once daily. Controller 3 g 6    losartan (COZAAR) 50 MG tablet Take 50 mg by mouth once daily.      NIFEdipine (ADALAT CC) 60 MG TbSR Take 30 mg by mouth once daily.      tiotropium (SPIRIVA WITH HANDIHALER) 18 mcg inhalation capsule Inhale 1 capsule (18 mcg total) into the lungs once daily. Controller (Patient not taking: Reported on 9/8/2020) 90 capsule 6     No current facility-administered medications for this visit.        Last PFT: 7/2019 severe obstruction with good response, moderate restriction, severe diffusion defect.   Chest xray 7/2019 IMPRESSION:  Pulmonary hyperinflation.  Linear opacities compatible with atelectasis or scarring in the lung bases.  Arteriosclerosis    Review of Systems  General: feeling  "well  Eyes: Vision is good.  ENT:  No sinusitis or pharyngitis.   Heart:: No chest pain or palpitations.  Lungs: breathing is stable   GI: No Nausea, vomiting, constipation, diarrhea, or reflux.  : No dysuria, hesitancy, or nocturia.  Musculoskeletal: No joint pain or myalgias.  Skin: No lesions or rashes.  Neuro: No headaches or neuropathy.  Lymph: swelling to legs  Psych: No anxiety or depression.  Endo: weight is stable     OBJECTIVE:      BP (!) 120/52 (BP Location: Left arm, Patient Position: Sitting, BP Method: Large (Automatic))   Pulse 78   Temp 98.4 °F (36.9 °C)   Ht 6' 3" (1.905 m)   Wt (!) 163.3 kg (360 lb) Comment: pt has not been able to get acc. weight  SpO2 (!) 94% Comment: 4LPM PD  BMI 45.00 kg/m²     Physical Exam  GENERAL: Older patient in no distress.  HEENT: Pupils equal and reactive. Extraocular movements intact. Nose intact.      Pharynx moist.  NECK: Supple.   HEART: Regular rate and rhythm. No murmur or gallop auscultated.  LUNGS: Clear but decreased to auscultation and percussion. Lung excursion symmetrical. No change in fremitus. No adventitial noises.  ABDOMEN:morbidly obese,  Bowel sounds present. Non-tender, no masses palpated.  EXTREMITIES: Normal muscle tone and joint movement, no cyanosis or clubbing.   LYMPHATICS: 2+ pitting edema to legs   SKIN: Dry, intact, no lesions.   NEURO: Cranial nerves II-XII intact. Motor strength 5/5 bilaterally, upper and lower extremities.  PSYCH: Appropriate affect.        Assessment:       1. Chronic obstructive pulmonary disease, unspecified COPD type    2. Chronic hypoxemic respiratory failure    3. SYBIL (obstructive sleep apnea)    4. Gastroesophageal reflux disease, esophagitis presence not specified    5. Post-nasal drip          Plan:       Chronic obstructive pulmonary disease, unspecified COPD type    Chronic hypoxemic respiratory failure    SYBIL (obstructive sleep apnea)    Gastroesophageal reflux disease, esophagitis presence not " specified    Post-nasal drip       Continue to use your Symbicort two puffs twice a day, rinse after you use  Continue  Spiriva 1 capsule daily  Oxygen all the time  Bipap with sleep, bleed oxygen in    allegra 180mg daily  Flonase 2 puffs to each nostril daily  Keep taking your reflux medication   Follow up in about 6 months (around 3/8/2021).

## 2020-11-05 ENCOUNTER — TELEPHONE (OUTPATIENT)
Dept: PULMONOLOGY | Facility: CLINIC | Age: 75
End: 2020-11-05

## 2020-11-05 NOTE — TELEPHONE ENCOUNTER
Wife said [facility] and ([facility]) that did the test told pt he should be on 2 LPM when walking around and moving (we have documentation stating hes on 4 LPM) and only 1 LPM with his BIPAP at night.  I asked her multiple times to make sure I was understanding her correctly and she said this was the same almita ([facility])  Dr Dee had to call and discuss an issue with another one of the tests he ran on this pt.  I asked her to call them and get them to send me a copy of the results and she said she would try.

## 2020-11-05 NOTE — TELEPHONE ENCOUNTER
Spoke with the wife told her to leave his setting as is and to not change. She said the [employer] barely walked him. He did not do a thorough 6 minute walk to ensure his sats stayed up.

## 2020-12-02 ENCOUNTER — TELEPHONE (OUTPATIENT)
Dept: PULMONOLOGY | Facility: CLINIC | Age: 75
End: 2020-12-02

## 2020-12-02 NOTE — TELEPHONE ENCOUNTER
----- Message from Candy Kenny sent at 12/2/2020  1:37 PM CST -----  [facility] (TESSALON) 100 MG capsule. Please call when Rx is ready for pickup.        Thank You        Candy

## 2021-06-30 ENCOUNTER — OFFICE VISIT (OUTPATIENT)
Dept: PULMONOLOGY | Facility: CLINIC | Age: 76
End: 2021-06-30
Payer: OTHER GOVERNMENT

## 2021-06-30 VITALS
OXYGEN SATURATION: 91 % | BODY MASS INDEX: 39.17 KG/M2 | DIASTOLIC BLOOD PRESSURE: 57 MMHG | HEART RATE: 70 BPM | WEIGHT: 315 LBS | HEIGHT: 75 IN | SYSTOLIC BLOOD PRESSURE: 118 MMHG

## 2021-06-30 DIAGNOSIS — J44.9 CHRONIC OBSTRUCTIVE PULMONARY DISEASE, UNSPECIFIED COPD TYPE: Primary | ICD-10-CM

## 2021-06-30 DIAGNOSIS — E66.01 MORBID OBESITY WITH BODY MASS INDEX OF 40.0-44.9 IN ADULT: ICD-10-CM

## 2021-06-30 DIAGNOSIS — G47.33 OSA (OBSTRUCTIVE SLEEP APNEA): ICD-10-CM

## 2021-06-30 DIAGNOSIS — J96.11 CHRONIC HYPOXEMIC RESPIRATORY FAILURE: ICD-10-CM

## 2021-06-30 PROCEDURE — 99214 OFFICE O/P EST MOD 30 MIN: CPT | Mod: 25,S$GLB,, | Performed by: INTERNAL MEDICINE

## 2021-06-30 PROCEDURE — 99214 PR OFFICE/OUTPT VISIT, EST, LEVL IV, 30-39 MIN: ICD-10-PCS | Mod: 25,S$GLB,, | Performed by: INTERNAL MEDICINE

## 2021-06-30 RX ORDER — ALBUTEROL SULFATE 90 UG/1
AEROSOL, METERED RESPIRATORY (INHALATION)
COMMUNITY
Start: 2020-07-23

## 2021-06-30 RX ORDER — BENZONATATE 200 MG/1
200 CAPSULE ORAL 3 TIMES DAILY PRN
Qty: 180 CAPSULE | Refills: 4 | Status: SHIPPED | OUTPATIENT
Start: 2021-06-30 | End: 2021-07-10

## 2021-07-16 ENCOUNTER — HOSPITAL ENCOUNTER (OUTPATIENT)
Dept: RADIOLOGY | Facility: HOSPITAL | Age: 76
Discharge: HOME | End: 2021-07-16
Attending: INTERNAL MEDICINE
Payer: OTHER GOVERNMENT

## 2021-07-16 ENCOUNTER — HOSPITAL ENCOUNTER (OUTPATIENT)
Dept: PULMONOLOGY | Facility: HOSPITAL | Age: 76
Discharge: HOME | End: 2021-07-16
Attending: INTERNAL MEDICINE
Payer: OTHER GOVERNMENT

## 2021-07-16 DIAGNOSIS — J44.9 CHRONIC OBSTRUCTIVE PULMONARY DISEASE, UNSPECIFIED COPD TYPE: ICD-10-CM

## 2021-07-16 PROCEDURE — 71046 X-RAY EXAM CHEST 2 VIEWS: CPT | Mod: TC

## 2021-07-16 PROCEDURE — 94729 DIFFUSING CAPACITY: CPT

## 2021-07-16 PROCEDURE — 94727 GAS DIL/WSHOT DETER LNG VOL: CPT

## 2021-07-16 PROCEDURE — 94010 BREATHING CAPACITY TEST: CPT

## 2021-07-16 PROCEDURE — 94060 EVALUATION OF WHEEZING: CPT

## 2021-07-19 ENCOUNTER — TELEPHONE (OUTPATIENT)
Dept: PULMONOLOGY | Facility: CLINIC | Age: 76
End: 2021-07-19

## 2021-09-30 ENCOUNTER — OFFICE VISIT (OUTPATIENT)
Dept: PULMONOLOGY | Facility: CLINIC | Age: 76
End: 2021-09-30
Payer: OTHER GOVERNMENT

## 2021-09-30 VITALS
BODY MASS INDEX: 38.36 KG/M2 | OXYGEN SATURATION: 93 % | WEIGHT: 315 LBS | DIASTOLIC BLOOD PRESSURE: 58 MMHG | SYSTOLIC BLOOD PRESSURE: 123 MMHG | HEIGHT: 76 IN | HEART RATE: 77 BPM

## 2021-09-30 DIAGNOSIS — J96.11 CHRONIC HYPOXEMIC RESPIRATORY FAILURE: ICD-10-CM

## 2021-09-30 DIAGNOSIS — G47.33 OSA (OBSTRUCTIVE SLEEP APNEA): ICD-10-CM

## 2021-09-30 DIAGNOSIS — E66.01 MORBID OBESITY WITH BODY MASS INDEX OF 40.0-44.9 IN ADULT: ICD-10-CM

## 2021-09-30 DIAGNOSIS — J44.9 CHRONIC OBSTRUCTIVE PULMONARY DISEASE, UNSPECIFIED COPD TYPE: Primary | ICD-10-CM

## 2021-09-30 DIAGNOSIS — R09.82 POST-NASAL DRIP: ICD-10-CM

## 2021-09-30 DIAGNOSIS — K21.9 GASTROESOPHAGEAL REFLUX DISEASE, UNSPECIFIED WHETHER ESOPHAGITIS PRESENT: ICD-10-CM

## 2021-09-30 PROCEDURE — 99214 PR OFFICE/OUTPT VISIT, EST, LEVL IV, 30-39 MIN: ICD-10-PCS | Mod: S$GLB,,, | Performed by: INTERNAL MEDICINE

## 2021-09-30 PROCEDURE — 99214 OFFICE O/P EST MOD 30 MIN: CPT | Mod: S$GLB,,, | Performed by: INTERNAL MEDICINE

## 2021-09-30 RX ORDER — BENZONATATE 200 MG/1
CAPSULE ORAL
COMMUNITY
Start: 2021-07-08

## 2021-09-30 RX ORDER — TRIAMCINOLONE ACETONIDE 1 MG/G
CREAM TOPICAL
COMMUNITY
Start: 2021-07-08

## 2021-10-29 ENCOUNTER — TELEPHONE (OUTPATIENT)
Dept: PULMONOLOGY | Facility: CLINIC | Age: 76
End: 2021-10-29
Payer: OTHER GOVERNMENT

## 2022-02-15 ENCOUNTER — OFFICE VISIT (OUTPATIENT)
Dept: PULMONOLOGY | Facility: CLINIC | Age: 77
End: 2022-02-15
Payer: OTHER GOVERNMENT

## 2022-02-15 VITALS
DIASTOLIC BLOOD PRESSURE: 80 MMHG | HEART RATE: 78 BPM | SYSTOLIC BLOOD PRESSURE: 123 MMHG | WEIGHT: 315 LBS | OXYGEN SATURATION: 95 % | BODY MASS INDEX: 43.58 KG/M2

## 2022-02-15 DIAGNOSIS — J96.11 CHRONIC HYPOXEMIC RESPIRATORY FAILURE: ICD-10-CM

## 2022-02-15 DIAGNOSIS — G47.33 OSA (OBSTRUCTIVE SLEEP APNEA): ICD-10-CM

## 2022-02-15 DIAGNOSIS — E66.01 MORBID OBESITY WITH BODY MASS INDEX OF 40.0-44.9 IN ADULT: ICD-10-CM

## 2022-02-15 DIAGNOSIS — J44.9 CHRONIC OBSTRUCTIVE PULMONARY DISEASE, UNSPECIFIED COPD TYPE: Primary | ICD-10-CM

## 2022-02-15 PROCEDURE — 99214 OFFICE O/P EST MOD 30 MIN: CPT | Mod: S$GLB,,, | Performed by: NURSE PRACTITIONER

## 2022-02-15 PROCEDURE — 99214 PR OFFICE/OUTPT VISIT, EST, LEVL IV, 30-39 MIN: ICD-10-PCS | Mod: S$GLB,,, | Performed by: NURSE PRACTITIONER

## 2022-02-15 RX ORDER — NITROGLYCERIN 0.4 MG/1
TABLET SUBLINGUAL
COMMUNITY
Start: 2021-11-22

## 2022-02-15 RX ORDER — FLUTICASONE PROPIONATE AND SALMETEROL 250; 50 UG/1; UG/1
POWDER RESPIRATORY (INHALATION)
COMMUNITY
Start: 2021-11-08 | End: 2023-09-19

## 2022-02-15 NOTE — PATIENT INSTRUCTIONS
Continue Wixila 1 puff twice a day   Continue  Spiriva 1 capsule daily  Oxygen all the time  Bipap with sleep, bleed oxygen in   Needs nebulizer  duoneb as needed every 4-6 hours for shortness of breath  If feels more benefit from the Duoneb will do regularly and stop the spiriva  Use albuterol as needed, either nebulizer or inhaler never both at same time   See if brovana [facility]

## 2022-02-15 NOTE — PROGRESS NOTES
SUBJECTIVE:    Patient ID: Jordy Jeffers is a 76 y.o. male.    Chief Complaint: Follow-up (4 month follow up copd )    Patient here today feeling well. He and his wife had Covid last month with no complications. He is wearing his oxygen all of the time and sleeping on his BIPAP with oxygen bled in.  He does still cough throughout the day.  He is [facility] Wixela [facility]. He takes tessalon pearls throughout the day as well.  He has not lost weight and his inspirations are limited do to abdomen. He does not have a nebulizer.  He is taking his GERD medication.    Past Medical History:   Diagnosis Date    CAD (coronary artery disease)     Chronic hypoxemic respiratory failure     Colon polyps     COPD (chronic obstructive pulmonary disease)     Diastolic heart failure     Diverticulosis     DM (diabetes mellitus)     HTN (hypertension)     Hypercholesterolemia     Lung disease     Obesity     SYBIL (obstructive sleep apnea)      Past Surgical History:   Procedure Laterality Date    coronary artery stent      r cataract resected      warfin tumor removed        Family History   Problem Relation Age of Onset    Heart failure Mother     Coronary artery disease Father         Social History:   Marital Status:   Occupation: [place] [employer] [place]  Alcohol History:  reports current alcohol use.  Tobacco History:  reports that he quit smoking about 19 years ago. His smoking use included cigarettes. He has a 60.00 pack-year smoking history. He has never used smokeless tobacco.  Drug History:  reports no history of drug use.    Review of patient's allergies indicates:   Allergen Reactions    Metformin        Current Outpatient Medications   Medication Sig Dispense Refill    albuterol (PROVENTIL/VENTOLIN HFA) 90 mcg/actuation inhaler INHALE 2 PUFFS BY MOUTH EVERY 4-6 HOURS AS NEEDED AS A RESCUE INHALER      amLODIPine (NORVASC) 10 MG tablet Take 10 mg by mouth once daily.      ammonium lactate  (LAC-HYDRIN) 12 % lotion by intratympanic route.      aspirin (ECOTRIN) 81 MG EC tablet Take 81 mg by mouth once daily.      atorvastatin (LIPITOR) 80 MG tablet Take 80 mg by mouth once daily.      benzonatate (TESSALON) 200 MG capsule TAKE ONE CAPSULE BY MOUTH THREE TIMES A DAY FOR COUGH      budesonide-formoterol 160-4.5 mcg (SYMBICORT) 160-4.5 mcg/actuation HFAA Inhale 2 puffs into the lungs every 12 (twelve) hours. Controller      bumetanide (BUMEX) 1 MG tablet Take 3 mg by mouth 2 (two) times daily.       clopidogrel (PLAVIX) 75 mg tablet Take 75 mg by mouth once daily.      empagliflozin (JARDIANCE) 25 mg tablet Take 1 tablet by mouth every morning.      fluticasone (FLONASE) 50 mcg/actuation nasal spray 1 spray by Each Nare route once daily.      fluticasone-salmeterol diskus inhaler 250-50 mcg INHALE 1 PUFF BY MOUTH TWICE A DAY **REPLACE SYMBICORT      insulin aspart U-100 (NOVOLOG) 100 unit/mL Crtg Inject 10 Units into the skin.      losartan (COZAAR) 100 MG tablet Take 100 mg by mouth once daily.      losartan (COZAAR) 50 MG tablet Take 50 mg by mouth once daily.      metoprolol succinate (TOPROL-XL) 50 MG 24 hr tablet Take 50 mg by mouth once daily.       NIFEdipine (ADALAT CC) 60 MG TbSR Take 30 mg by mouth once daily.      nitroGLYCERIN (NITROSTAT) 0.4 MG SL tablet DISSOLVE ONE TABLET UNDER TONGUE EVERY 5 MINUTES FOR 3 DOSES-IF NO RELIEF, GO TO ER AS NEEDED FOR CHEST PAIN      pantoprazole (PROTONIX) 40 MG tablet Take 40 mg by mouth once daily.      potassium chloride (KLOR-CON) 10 MEQ TbSR Take 10 mEq by mouth 2 (two) times daily.       spironolactone (ALDACTONE) 25 MG tablet Take 25 mg by mouth.      terazosin (HYTRIN) 2 MG capsule Take 2 mg by mouth every evening.      tiotropium bromide (SPIRIVA RESPIMAT) 2.5 mcg/actuation Mist Inhale 2 puffs into the lungs once daily. Controller 3 g 6    triamcinolone acetonide 0.1% (KENALOG) 0.1 % cream APPLY SMALL AMOUNT TOPICALLY TWICE A DAY  AS NEEDED       No current facility-administered medications for this visit.       Last PFT:  06/30/2021  Moderate obstruction, moderate restriction, and severe diffusion defect  Last Chest xray:  07/16/2021  Clear lung fields    Review of Systems  General: feeling well  Eyes: Vision is good.  ENT:  No sinusitis or pharyngitis.   Heart:: No chest pain or palpitations.  Lungs: breathing is stable, does cough throughout the day  GI: No Nausea, vomiting, constipation, diarrhea, or reflux.  : No dysuria, hesitancy, or nocturia.  Musculoskeletal:  His right knee and is back hurt  Skin: No lesions or rashes.  Neuro: No headaches or neuropathy.  Lymph: swelling to legs  Psych: No anxiety or depression.  Endo: weight is stable     OBJECTIVE:      /80 (BP Location: Right arm, Patient Position: Sitting)   Pulse 78   Wt (!) 162.4 kg (358 lb)   SpO2 95% Comment: 4.0 oxygen level  BMI 43.58 kg/m²     Physical Exam  GENERAL: Older patient in no distress.  HEENT: Pupils equal and reactive. Extraocular movements intact. Nose intact.  Pharynx moist.  NECK: Supple.   HEART: Regular rate and rhythm. No murmur or gallop auscultated.  LUNGS: Clear but decreased to auscultation and percussion. Lung excursion symmetrical. No change in fremitus. No adventitial noises.  ABDOMEN:morbidly obese,  Bowel sounds present. Non-tender, no masses palpated.  EXTREMITIES: Normal muscle tone and joint movement, no cyanosis or clubbing.   LYMPHATICS: 2+ pitting edema to legs   SKIN: Dry, intact, no lesions.   NEURO: Cranial nerves II-XII intact. Motor strength 5/5 bilaterally, upper and lower extremities.  PSYCH: Appropriate affect.        Assessment:       1. Chronic obstructive pulmonary disease, unspecified COPD type    2. SYBIL (obstructive sleep apnea)    3. Chronic hypoxemic respiratory failure    4. Morbid obesity with body mass index of 40.0-44.9 in adult          Plan:       Chronic obstructive pulmonary disease, unspecified COPD  type    SYBIL (obstructive sleep apnea)    Chronic hypoxemic respiratory failure    Morbid obesity with body mass index of 40.0-44.9 in adult       Continue Wixila 1 puff twice a day   Continue  Spiriva 1 capsule daily  Oxygen all the time  Bipap with sleep, bleed oxygen in   Needs nebulizer  duoneb as needed every 4-6 hours for shortness of breath  If feels more benefit from the Duoneb will do regularly and stop the spiriva  Use albuterol as needed, either nebulizer or inhaler never both at same time   See if brovana [facility]    Follow up in about 6 months (around 8/15/2022).

## 2022-02-16 ENCOUNTER — TELEPHONE (OUTPATIENT)
Dept: PULMONOLOGY | Facility: CLINIC | Age: 77
End: 2022-02-16
Payer: OTHER GOVERNMENT

## 2022-02-16 NOTE — TELEPHONE ENCOUNTER
----- Message from Huyen Garcia sent at 2/16/2022  2:08 PM CST -----  Regarding: Leslee from [facility] called  Leslee  from [facility] called and is requesting notes on patient and why patient is in a need for a nebulizer. Please fax notes to [phone]   He can be reached at [phone]

## 2022-09-19 ENCOUNTER — OFFICE VISIT (OUTPATIENT)
Dept: PULMONOLOGY | Facility: CLINIC | Age: 77
End: 2022-09-19
Payer: OTHER GOVERNMENT

## 2022-09-19 VITALS
OXYGEN SATURATION: 93 % | WEIGHT: 315 LBS | HEIGHT: 76 IN | DIASTOLIC BLOOD PRESSURE: 65 MMHG | BODY MASS INDEX: 38.36 KG/M2 | SYSTOLIC BLOOD PRESSURE: 125 MMHG | HEART RATE: 84 BPM

## 2022-09-19 DIAGNOSIS — G47.33 OSA (OBSTRUCTIVE SLEEP APNEA): ICD-10-CM

## 2022-09-19 DIAGNOSIS — J96.11 CHRONIC HYPOXEMIC RESPIRATORY FAILURE: ICD-10-CM

## 2022-09-19 DIAGNOSIS — J44.9 CHRONIC OBSTRUCTIVE PULMONARY DISEASE, UNSPECIFIED COPD TYPE: Primary | ICD-10-CM

## 2022-09-19 PROCEDURE — 99214 OFFICE O/P EST MOD 30 MIN: CPT | Mod: S$GLB,,, | Performed by: NURSE PRACTITIONER

## 2022-09-19 PROCEDURE — 99214 PR OFFICE/OUTPT VISIT, EST, LEVL IV, 30-39 MIN: ICD-10-PCS | Mod: S$GLB,,, | Performed by: NURSE PRACTITIONER

## 2022-09-19 NOTE — PROGRESS NOTES
SUBJECTIVE:    Patient ID: Jordy Jeffers is a 77 y.o. male.    Chief Complaint: Follow-up and COPD    Patient here today feeling well.  He is wearing his oxygen all of the time and sleeping on his BIPAP with oxygen bled in.  He is using Symbicort till out then has Wixela at home and spiriva. He is also using Duonebs three times a day and taking Tessalon three times a day.  He is short of breath with exertion but stable. He does suffer with chronic reflux as well.     Past Medical History:   Diagnosis Date    CAD (coronary artery disease)     Chronic hypoxemic respiratory failure     Colon polyps     COPD (chronic obstructive pulmonary disease)     Diastolic heart failure     Diverticulosis     DM (diabetes mellitus)     HTN (hypertension)     Hypercholesterolemia     Lung disease     Obesity     SYBIL (obstructive sleep apnea)      Past Surgical History:   Procedure Laterality Date    coronary artery stent      r cataract resected      warfin tumor removed        Family History   Problem Relation Age of Onset    Heart failure Mother     Coronary artery disease Father         Social History:   Marital Status:   Occupation: retired [employer]  Alcohol History:  reports current alcohol use.  Tobacco History:  reports that he quit smoking about 19 years ago. His smoking use included cigarettes. He has a 60.00 pack-year smoking history. He has never used smokeless tobacco.  Drug History:  reports no history of drug use.    Review of patient's allergies indicates:   Allergen Reactions    Metformin        Current Outpatient Medications   Medication Sig Dispense Refill    albuterol (PROVENTIL/VENTOLIN HFA) 90 mcg/actuation inhaler INHALE 2 PUFFS BY MOUTH EVERY 4-6 HOURS AS NEEDED AS A RESCUE INHALER      amLODIPine (NORVASC) 10 MG tablet Take 10 mg by mouth once daily.      atorvastatin (LIPITOR) 80 MG tablet Take 80 mg by mouth once daily.      benzonatate (TESSALON) 200 MG capsule TAKE ONE CAPSULE BY MOUTH THREE TIMES A  DAY FOR COUGH      budesonide-formoterol 160-4.5 mcg (SYMBICORT) 160-4.5 mcg/actuation HFAA Inhale 2 puffs into the lungs every 12 (twelve) hours. Controller      bumetanide (BUMEX) 1 MG tablet Take 3 mg by mouth 2 (two) times daily.       clopidogrel (PLAVIX) 75 mg tablet Take 75 mg by mouth once daily.      empagliflozin (JARDIANCE) 25 mg tablet Take 1 tablet by mouth every morning.      fluticasone (FLONASE) 50 mcg/actuation nasal spray 1 spray by Each Nare route once daily.      insulin aspart U-100 (NOVOLOG) 100 unit/mL Crtg Inject 10 Units into the skin.      losartan (COZAAR) 100 MG tablet Take 100 mg by mouth once daily.      metoprolol succinate (TOPROL-XL) 50 MG 24 hr tablet Take 50 mg by mouth once daily.       nitroGLYCERIN (NITROSTAT) 0.4 MG SL tablet DISSOLVE ONE TABLET UNDER TONGUE EVERY 5 MINUTES FOR 3 DOSES-IF NO RELIEF, GO TO ER AS NEEDED FOR CHEST PAIN      pantoprazole (PROTONIX) 40 MG tablet Take 40 mg by mouth once daily.      potassium chloride (KLOR-CON) 10 MEQ TbSR Take 10 mEq by mouth 2 (two) times daily.       spironolactone (ALDACTONE) 25 MG tablet Take 25 mg by mouth.      terazosin (HYTRIN) 2 MG capsule Take 2 mg by mouth every evening.      tiotropium bromide (SPIRIVA RESPIMAT) 2.5 mcg/actuation Mist Inhale 2 puffs into the lungs once daily. Controller 3 g 6    triamcinolone acetonide 0.1% (KENALOG) 0.1 % cream APPLY SMALL AMOUNT TOPICALLY TWICE A DAY AS NEEDED      ammonium lactate (LAC-HYDRIN) 12 % lotion by intratympanic route.      aspirin (ECOTRIN) 81 MG EC tablet Take 81 mg by mouth once daily.      fluticasone-salmeterol diskus inhaler 250-50 mcg WILL START TAKING WHEN OUT OF SYMBICORT WHICH SHOULD BE AROUND 10/22      losartan (COZAAR) 50 MG tablet Take 50 mg by mouth once daily.      NIFEdipine (ADALAT CC) 60 MG TbSR Take 30 mg by mouth once daily.       No current facility-administered medications for this visit.       Last PFT:  06/30/2021  Moderate obstruction, moderate  "restriction, and severe diffusion defect  Last Chest xray:  07/16/2021  Clear lung fields    Review of Systems  General: feeling well  Eyes: Vision is good.  ENT:  No sinusitis or pharyngitis.   Heart:: No chest pain or palpitations.  Lungs: breathing is stable, does cough throughout the day  GI: GERD  : No dysuria, hesitancy, or nocturia.  Musculoskeletal:  chronic back pain   Skin: No lesions or rashes.  Neuro: No headaches or neuropathy.  Lymph: swelling to legs  Psych: No anxiety or depression.  Endo: weight is stable     OBJECTIVE:      /65 (BP Location: Left arm, Patient Position: Sitting, BP Method: Large (Manual))   Pulse 84   Ht 6' 4" (1.93 m)   Wt (!) 165.1 kg (364 lb)   SpO2 (!) 93% Comment: 4LPM PD  BMI 44.31 kg/m²     Physical Exam  GENERAL: Older patient in no distress.  HEENT: Pupils equal and reactive. Extraocular movements intact. Nose intact.  Pharynx moist.  NECK: Supple.   HEART: Regular rate and rhythm. No murmur or gallop auscultated.  LUNGS: Clear but decreased to auscultation and percussion. Lung excursion symmetrical. No change in fremitus. No adventitial noises.  ABDOMEN:morbidly obese,  Bowel sounds present. Non-tender, no masses palpated.  EXTREMITIES: Normal muscle tone and joint movement, no cyanosis or clubbing.   LYMPHATICS: 2+ pitting edema to legs   SKIN: Dry, intact, no lesions.   NEURO: Cranial nerves II-XII intact. Motor strength 5/5 bilaterally, upper and lower extremities.  PSYCH: Appropriate affect.        Assessment:       1. Chronic obstructive pulmonary disease, unspecified COPD type    2. Chronic hypoxemic respiratory failure    3. SYBIL (obstructive sleep apnea)            Plan:       Chronic obstructive pulmonary disease, unspecified COPD type    Chronic hypoxemic respiratory failure    SYBIL (obstructive sleep apnea)       Continue Wixila 1 puff twice a day   Stop the spiriva   Oxygen all the time  Bipap with sleep, bleed oxygen in   Continue the " duoneb(albuterol and ipratropium in nebulizer) every 4-6 hours  [facility] Tessalon [facility]b [facility]  Need to see GI doctor for reflux     Follow up in about 6 months (around 3/19/2023).

## 2022-09-19 NOTE — PATIENT INSTRUCTIONS
Continue Wixila 1 puff twice a day   Stop the spiriva   Oxygen all the time  Bipap with sleep, bleed oxygen in   Continue the duoneb(albuterol and ipratropium in nebulizer) every 4-6 hours  Script for Tessalon and du[facility]b for [facility]  Need to see GI doctor for reflux     Follow up in about 6 months (around 3/19/2023).

## 2022-11-17 ENCOUNTER — TELEPHONE (OUTPATIENT)
Dept: PULMONOLOGY | Facility: CLINIC | Age: 77
End: 2022-11-17

## 2022-11-17 NOTE — TELEPHONE ENCOUNTER
Patient wife left voicemail in reference to her  needing a written RX for [facility]icort to [facility].  Documentation said will start Fluticasone-Salmeterol when out of Symbicort around 10/22/2022.

## 2022-12-28 ENCOUNTER — OFFICE VISIT (OUTPATIENT)
Dept: GASTROENTEROLOGY | Facility: CLINIC | Age: 77
End: 2022-12-28
Payer: OTHER GOVERNMENT

## 2022-12-28 VITALS — BODY MASS INDEX: 38.36 KG/M2 | HEIGHT: 76 IN | RESPIRATION RATE: 16 BRPM | WEIGHT: 315 LBS

## 2022-12-28 DIAGNOSIS — Z87.19 HISTORY OF GASTROESOPHAGEAL REFLUX (GERD): ICD-10-CM

## 2022-12-28 DIAGNOSIS — Z87.09 HISTORY OF COPD: ICD-10-CM

## 2022-12-28 DIAGNOSIS — Z86.010 HISTORY OF COLON POLYPS: ICD-10-CM

## 2022-12-28 DIAGNOSIS — R12 HEARTBURN: Primary | ICD-10-CM

## 2022-12-28 DIAGNOSIS — Z79.01 ANTICOAGULANT LONG-TERM USE: ICD-10-CM

## 2022-12-28 PROCEDURE — 99215 OFFICE O/P EST HI 40 MIN: CPT | Mod: PBBFAC,PN | Performed by: NURSE PRACTITIONER

## 2022-12-28 PROCEDURE — 99999 PR PBB SHADOW E&M-EST. PATIENT-LVL V: CPT | Mod: PBBFAC,,, | Performed by: NURSE PRACTITIONER

## 2022-12-28 PROCEDURE — 99999 PR PBB SHADOW E&M-EST. PATIENT-LVL V: ICD-10-PCS | Mod: PBBFAC,,, | Performed by: NURSE PRACTITIONER

## 2022-12-28 PROCEDURE — 99203 OFFICE O/P NEW LOW 30 MIN: CPT | Mod: S$PBB,,, | Performed by: NURSE PRACTITIONER

## 2022-12-28 PROCEDURE — 99203 PR OFFICE/OUTPT VISIT, NEW, LEVL III, 30-44 MIN: ICD-10-PCS | Mod: S$PBB,,, | Performed by: NURSE PRACTITIONER

## 2022-12-28 NOTE — PATIENT INSTRUCTIONS
"GERD (Adult)    The esophagus is a tube that carries food from the mouth to the stomach. A valve at the lower end of the esophagus prevents stomach acid from flowing upward. When this valve doesn't work properly, stomach contents may repeatedly flow back up (reflux) into the esophagus. This is called gastroesophageal reflux disease (GERD). GERD can irritate the esophagus. It can cause problems with swallowing or breathing. In severe cases, GERD can cause recurrent pneumonia or other serious problems.  Symptoms of reflux include burning, pressure or sharp pain in the upper abdomen or mid to lower chest. The pain can spread to the neck, back, or shoulder. There may be belching, an acid taste in the back of the throat, chronic cough, or sore throat or hoarseness. GERD symptoms often occur during the day after a big meal. They can also occur at night when lying down.   Home care  Lifestyle changes can help reduce symptoms. If needed, medicines may be prescribed. Symptoms often improve with treatment, but if treatment is stopped, the symptoms often return after a few months. So most persons with GERD will need to continue treatment.  Lifestyle changes  Limit or avoid fatty, fried, and spicy foods, as well as coffee, chocolate, mint, and foods with high acid content such as tomatoes and citrus fruit and juices (orange, grapefruit, lemon).  Dont eat large meals, especially at night. Frequent, smaller meals are best. Do not lie down right after eating. And dont eat anything 3 hours before going to bed.  Avoid drinking alcohol and smoking. As much as possible, stay away from second hand smoke.  If you are overweight, losing weight will reduce symptoms.   Avoid wearing tight clothing around your stomach area.  If your symptoms occur during sleep, use a foam wedge to elevate your upper body (not just your head.) Or, place 4" blocks under the head of your bed.  Medicines  If needed, medicines can help relieve the symptoms of " GERD and prevent damage to the esophagus. Discuss a medicine plan with your healthcare provider. This may include one or more of the following medicines:  Antacids to help neutralize the normal acids in your stomach.  Acid blockers (H2 blockers) to decrease acid production.  Acid inhibitors (PPIs) to decrease acid production in a different way than the blockers. They may work better, but can take a little longer to take effect.  Take an antacid 30-60 minutes after eating and at bedtime, but not at the same time as an acid blocker.  Try not to take medicines such as ibuprofen and aspirin. If you are taking aspirin for your heart or other medical reasons, talk to your healthcare provider about stopping it.  Follow-up care  Follow up with your healthcare provider or as advised by our staff.  When to seek medical advice  Call your healthcare provider if any of the following occur:  Stomach pain gets worse or moves to the lower right abdomen (appendix area)  Chest pain appears or gets worse, or spreads to the back, neck, shoulder, or arm  Frequent vomiting (cant keep down liquids)  Blood in the stool or vomit (red or black in color)  Feeling weak or dizzy  Fever of 100.4ºF (38ºC) or higher, or as directed by your healthcare provider  Date Last Reviewed: 6/23/2015  © 0623-9968 [employer] [address] [place] [address], [place] [address]. All rights reserved. This information is not intended as a substitute for professional medical care. Always follow your healthcare professional's instructions.         Discharge Instructions: Eating a Soft Diet  You have been prescribed a soft diet (also called gastrointestinal soft diet or bland diet). This reduces the amount of work your digestive tract has to do. It also reduces the chance that your digestive tract will be irritated by the food you eat. A soft diet is prescribed for people with digestive problems. The diet consists of foods that are tender, mildly seasoned,  and easy to digest. While on this diet, you should not eat fried or spicy foods, or raw fruits and vegetables. Also avoid alcoholic beverages.  General guidelines  Eat in a calm, relaxed atmosphere. How you eat may be as important as what you eat. Dont rush while eating. Chew your food slowly and thoroughly, and swallow slowly.  Eat small frequent meals throughout the day, but dont eat within 2 hours of bedtime.  Avoid any foods that cause discomfort.  Dont use NSAIDs (nonsteroidal anti-inflammatory drugs), such as aspirin, and ibuprofen. Also avoid medicine that contain aspirin. NSAIDs can cause ulcers and delay or prevent ulcer healing.  Use antacids as needed, but keep in mind that magnesium-containing antacids may cause diarrhea.  Foods to eat  Cream of wheat and cream of rice  Cooked white rice  Mashed potatoes, and boiled potatoes without skin  Plain pasta and noodles  Plain white crackers (such as no-salt soda crackers)  White bread  Applesauce  Cooked fruits without skins or seeds  Mild juices, such as apple and grape  Bananas  Cooked or mashed vegetables without stems and seeds  Carrots  Summer squash (zucchini, yellow squash)  Winter squash (acorn, butternut, spaghetti squash)  Cottage cheese  Mild hard or soft cheeses  Custard  Yogurt without seeds or nuts  Milk (you may need lactose-free milk)  Ice cream without seeds or nuts  Smooth peanut butter  Eggs  Fish, turkey, chicken, or other meat that is not tough or stringy  Tofu  Foods to avoid  Nuts and seeds  Snack foods, such as the following:  Chocolate-containing snacks, candy, pastries, or cakes.  Potato chips (plain, barbecued, or other flavors)  Taco chips or nachos  Corn chips  Popcorn, popcorn cakes, or rice cakes  Crackers with nuts, seeds, or spicy seasonings  French fries  Fried or greasy foods  Whole-grain breads, rolls, and crackers  Breads and rolls with nuts, seeds, or bran  Bran and granola cereals  Berries with seeds, such as  strawberries, raspberries, and blackberries  Acidic fruits, such as oranges, grapefruits, luis armando, limes, and pineapples  Raw vegetables  Mild or hot peppers  Sauerkraut and pickled vegetables  Tomatoes or tomato products, such as tomato paste, tomato sauce, and tomato juice  Barbecue sauce  Spicy or flavored cheeses, such as jalapeño and black pepper cheese  Crunchy peanut butter  Dried cooked beans, such as martinez, kidney, or navy beans  The following meats:  Fried or greasy meats  Processed, spicy meats, such as sausage, gallegos, ham, and lunch meats  Ribs and other meats with barbecue sauce  Tough or stringy meats, such as corned beef or beef jerky  Fluids to avoid  Alcoholic beverages  Coffee and regular teas  Brianne and other drinks with caffeine  Cranberry, orange, pineapple, and grapefruit juice  Lemonade  Vegetable juice  Whole milk, if you are lactose intolerant  Follow-up  Make a follow-up appointment with a dietitian as directed by our staff.  Date Last Reviewed: 6/21/2015  © 5585-0807 [employer] [address] [place] [address], [place] [address]. All rights reserved. This information is not intended as a substitute for professional medical care. Always follow your healthcare professional's instructions.

## 2022-12-28 NOTE — PROGRESS NOTES
Subjective:       Patient ID: Jordy Jeffers is a 77 y.o. male Body mass index is 44.14 kg/m².    Chief Complaint: Gastroesophageal Reflux    This patient is new to me.  Referring Provider: Dr. Inocencia Fernandes for dysphagia.     [facility], summarized throughout progress note.  Blood work reviewed-see records for full results.  Referral note in media indicates chronic cough with history of esophagitis and dysphagia. Patient denies dysphagia. Reports history of neck mass. [facility] it with [facility].  Patient is here with a family member, whom assisted with history.    Reviewed medical records received from pt, summarized below and in medical & surgical history (endoscopies, etc), copies made & given to nurse to be scanned into system:   7/25/2022 CMP with LFTs WNL  2/6/2019 EGD & colonoscopy    Gastroesophageal Reflux  He complains of belching (not more than usual), coughing (history of COPD; seeing pulmonology, on oxygen therapy via nasal cannula; productive of yellow sputum) and heartburn (once in 9/2022; none recently). He reports no abdominal pain, no chest pain, no choking, no dysphagia, no hoarse voice, no nausea or no sore throat. Pertinent negatives include no fatigue, melena or weight loss. Risk factors include obesity and smoking/tobacco exposure. He has tried a PPI (protonix 40 mg once daily) for the symptoms. The treatment provided significant relief. Past procedures include an EGD.     Review of Systems   Constitutional:  Negative for appetite change, chills, fatigue, fever and weight loss.   HENT:  Negative for hoarse voice, sore throat and trouble swallowing.    Respiratory:  Positive for cough (history of COPD; seeing pulmonology, on oxygen therapy via nasal cannula; productive of yellow sputum) and shortness of breath (chronic; denies change in symptom). Negative for choking.    Cardiovascular:  Negative for chest  pain.   Gastrointestinal:  Positive for heartburn (once in 2022; none recently). Negative for abdominal pain, anal bleeding, blood in stool, constipation, diarrhea, dysphagia, melena, nausea, rectal pain and vomiting.   Genitourinary:  Negative for difficulty urinating, dysuria and flank pain.   Neurological:  Negative for weakness.       Past Medical History:   Diagnosis Date    CAD (coronary artery disease)     Chronic hypoxemic respiratory failure     Colon polyps     COPD (chronic obstructive pulmonary disease)     Diastolic heart failure     Diverticulosis     DM (diabetes mellitus)     GERD (gastroesophageal reflux disease)     HTN (hypertension)     Hypercholesterolemia     Lung disease     Obesity     SYBIL (obstructive sleep apnea)      Past Surgical History:   Procedure Laterality Date    COLONOSCOPY  2019    Dr. Gonzalez, sent for scanning: diverticulosis, hemorrhoids, more than 6 polyps removed; biopsy: tubular adenomas and hyperplastic polyp; repeat in 3-5 years for surveillance    coronary artery stent      r cataract resected      UPPER GASTROINTESTINAL ENDOSCOPY  2019    Dr. Gonzalez, sent for scanning: candida esophagitis; hiatal hernia, gastgritis, duodenitis; biopsy: GEJ- reflux, negative for ramires's; stomach chronic inactive inflammation    warfin tumor removed        Family History   Problem Relation Age of Onset    Heart failure Mother     Coronary artery disease Father     Colon cancer Neg Hx     Esophageal cancer Neg Hx     Stomach cancer Neg Hx      Social History     Tobacco Use    Smoking status: Former     Packs/day: 1.00     Years: 60.00     Pack years: 60.00     Types: Cigarettes     Quit date:      Years since quittin.0    Smokeless tobacco: Never   Substance Use Topics    Alcohol use: Not Currently    Drug use: No     Wt Readings from Last 10 Encounters:   22 (!) 164.5 kg (362 lb 10.5 oz)   22 (!) 165.1 kg (364 lb)   02/15/22 (!) 162.4 kg (358 lb)    09/30/21 (!) 164.7 kg (363 lb)   06/30/21 (!) 164.7 kg (363 lb)   09/08/20 (!) 163.3 kg (360 lb)   01/14/20 (!) 159.7 kg (352 lb)   07/09/19 (!) 158.8 kg (350 lb)   11/29/18 (!) 147 kg (324 lb)     Reviewed prior medical records including radiology report of 7/16/2021 chest x-ray.    Objective:      Physical Exam  Vitals and nursing note reviewed.   Constitutional:       General: He is not in acute distress.     Appearance: Normal appearance. He is well-developed. He is not diaphoretic.      Comments: Patient is on oxygen therapy via nasal cannula and using a walker for assistance with ambulation.   HENT:      Mouth/Throat:      Lips: Pink. No lesions.      Mouth: Mucous membranes are moist. No oral lesions.      Tongue: No lesions.      Pharynx: Oropharynx is clear. No pharyngeal swelling or posterior oropharyngeal erythema.   Eyes:      General: No scleral icterus.     Conjunctiva/sclera: Conjunctivae normal.   Pulmonary:      Effort: Pulmonary effort is normal. No respiratory distress.      Breath sounds: Normal breath sounds. No wheezing.   Abdominal:      General: Bowel sounds are normal. There is no distension.      Palpations: Abdomen is soft. Abdomen is not rigid. There is no mass.      Tenderness: There is no abdominal tenderness. There is no guarding or rebound.   Skin:     General: Skin is warm and dry.      Coloration: Skin is not jaundiced or pale.      Findings: No erythema or rash.   Neurological:      Mental Status: He is alert and oriented to person, place, and time.   Psychiatric:         Behavior: Behavior normal.         Thought Content: Thought content normal.         Judgment: Judgment normal.       Assessment:       1. Heartburn    2. History of gastroesophageal reflux (GERD)    3. History of colon polyps    4. History of COPD    5. Anticoagulant long-term use          Plan:       Heartburn & History of gastroesophageal reflux (GERD)  -     Case Request Endoscopy: EGD  (ESOPHAGOGASTRODUODENOSCOPY), COLONOSCOPY  - schedule EGD, discussed procedure with patient, including risks and benefits, patient verbalized understanding  - CONTINUE PROTONIX 40 MG ONCE DAILY AS DIRECTED  - avoid/minimize use of NSAIDs- since they can cause GI upset, bleeding and/or ulcers. If NSAID must be taken, recommend take with food.    History of colon polyps  -     Case Request Endoscopy: EGD (ESOPHAGOGASTRODUODENOSCOPY), COLONOSCOPY  - schedule Colonoscopy, discussed procedure with the patient, including risks and benefits, patient verbalized understanding    History of COPD  - follow-up with PCP & pulmonology for continued evaluation and management  - if experiencing symptoms of headache, chest pain, severe/persistent shortness of breath, dizziness, and/or blurred vision, recommend going to ER for further evaluation and management    Anticoagulant long-term use  - informed patient that the anticoagulant(s) will likely need to be held for endoscopy, nurse will confirm with endoscopist, cardiologist, and/or PCP.    Follow up in about 1 month (around 1/28/2023), or if symptoms worsen or fail to improve.      If no improvement in symptoms or symptoms worsen, call/follow-up at clinic or go to ER.        32 minutes of total time spent on the encounter, which includes face to face time and non-face to face time preparing to see the patient (e.g., review of tests), Obtaining and/or reviewing separately obtained history, Documenting clinical information in the electronic or other health record, Independently interpreting results (not separately reported) and communicating results to the patient/family/caregiver, or Care coordination (not separately reported).

## 2023-02-17 ENCOUNTER — TELEPHONE (OUTPATIENT)
Dept: PULMONOLOGY | Facility: CLINIC | Age: 78
End: 2023-02-17

## 2023-02-17 NOTE — TELEPHONE ENCOUNTER
Patient's wife called said  [facility] .  She said the meds help when he has them.  Please advise

## 2023-03-14 ENCOUNTER — TELEPHONE (OUTPATIENT)
Dept: GASTROENTEROLOGY | Facility: CLINIC | Age: 78
End: 2023-03-14
Payer: OTHER GOVERNMENT

## 2023-03-14 NOTE — TELEPHONE ENCOUNTER
Returned call to follow up. Provided clarified diet recommendation. Sent copy of prep instructions to wife's portal.

## 2023-03-14 NOTE — TELEPHONE ENCOUNTER
----- Message from Nathaniel Mullen sent at 3/14/2023 11:34 AM CDT -----  Type: Needs Medical Advice  Who Called:  pt wife Maryam  Symptoms (please be specific):  wife said she need to ask a question about the procedure on 3/17--please call and advise  Best Call Back Number: [phone] (home)     Additional Information: thank you

## 2023-03-16 ENCOUNTER — TELEPHONE (OUTPATIENT)
Dept: GASTROENTEROLOGY | Facility: CLINIC | Age: 78
End: 2023-03-16
Payer: OTHER GOVERNMENT

## 2023-03-16 NOTE — TELEPHONE ENCOUNTER
----- Message from Malena Washington sent at 3/16/2023 10:47 AM CDT -----  Contact: Pt's wife/ Maryam @ [phone]  Type:  Needs Medical Advice    Who Called: Pt's wife/ Maryam  SWould the patient rather a call back or a response via [facility]? call  Best Call Back Number: [phone]  Additional Information: Pt's wife wants to know if the pt can drink [employer] rootbeer or does the carbonated beverage that the pt is allowed to drink for his prep has to be clear. He has a colonoscopy scheduled on tomorrow 03/17/2023. Please call pt's wife back to advise.

## 2023-03-17 ENCOUNTER — ANESTHESIA EVENT (OUTPATIENT)
Dept: ENDOSCOPY | Facility: HOSPITAL | Age: 78
End: 2023-03-17
Payer: OTHER GOVERNMENT

## 2023-03-17 ENCOUNTER — HOSPITAL ENCOUNTER (OUTPATIENT)
Facility: HOSPITAL | Age: 78
Discharge: HOME | End: 2023-03-17
Attending: INTERNAL MEDICINE | Admitting: INTERNAL MEDICINE
Payer: OTHER GOVERNMENT

## 2023-03-17 ENCOUNTER — ANESTHESIA (OUTPATIENT)
Dept: ENDOSCOPY | Facility: HOSPITAL | Age: 78
End: 2023-03-17
Payer: OTHER GOVERNMENT

## 2023-03-17 VITALS
RESPIRATION RATE: 20 BRPM | TEMPERATURE: 98 F | HEART RATE: 90 BPM | BODY MASS INDEX: 40.43 KG/M2 | WEIGHT: 315 LBS | SYSTOLIC BLOOD PRESSURE: 157 MMHG | DIASTOLIC BLOOD PRESSURE: 71 MMHG | HEIGHT: 74 IN | OXYGEN SATURATION: 97 %

## 2023-03-17 DIAGNOSIS — K21.9 GERD (GASTROESOPHAGEAL REFLUX DISEASE): ICD-10-CM

## 2023-03-17 DIAGNOSIS — K44.9 HIATAL HERNIA: ICD-10-CM

## 2023-03-17 DIAGNOSIS — K31.7 GASTRIC POLYPS: ICD-10-CM

## 2023-03-17 DIAGNOSIS — K57.90 DIVERTICULOSIS: ICD-10-CM

## 2023-03-17 DIAGNOSIS — K21.9 GASTROESOPHAGEAL REFLUX DISEASE, UNSPECIFIED WHETHER ESOPHAGITIS PRESENT: ICD-10-CM

## 2023-03-17 DIAGNOSIS — K63.5 POLYP OF COLON, UNSPECIFIED PART OF COLON, UNSPECIFIED TYPE: ICD-10-CM

## 2023-03-17 DIAGNOSIS — K64.8 INTERNAL HEMORRHOIDS: ICD-10-CM

## 2023-03-17 DIAGNOSIS — K29.70 GASTRITIS, PRESENCE OF BLEEDING UNSPECIFIED, UNSPECIFIED CHRONICITY, UNSPECIFIED GASTRITIS TYPE: Primary | ICD-10-CM

## 2023-03-17 PROCEDURE — D9220A PRA ANESTHESIA: ICD-10-PCS | Mod: 33,CRNA,, | Performed by: STUDENT IN AN ORGANIZED HEALTH CARE EDUCATION/TRAINING PROGRAM

## 2023-03-17 PROCEDURE — 27201042 HC RETRIEVAL NET: Performed by: INTERNAL MEDICINE

## 2023-03-17 PROCEDURE — 43251 PR EGD, FLEX, W/REMOVAL, TUMOR/POLYP/LESION(S), SNARE: ICD-10-PCS | Mod: 51,,, | Performed by: INTERNAL MEDICINE

## 2023-03-17 PROCEDURE — D9220A PRA ANESTHESIA: Mod: 33,CRNA,, | Performed by: STUDENT IN AN ORGANIZED HEALTH CARE EDUCATION/TRAINING PROGRAM

## 2023-03-17 PROCEDURE — 45380 COLONOSCOPY AND BIOPSY: CPT | Mod: PT,59 | Performed by: INTERNAL MEDICINE

## 2023-03-17 PROCEDURE — 88305 TISSUE EXAM BY PATHOLOGIST: CPT | Mod: 59 | Performed by: STUDENT IN AN ORGANIZED HEALTH CARE EDUCATION/TRAINING PROGRAM

## 2023-03-17 PROCEDURE — 25000003 PHARM REV CODE 250: Performed by: STUDENT IN AN ORGANIZED HEALTH CARE EDUCATION/TRAINING PROGRAM

## 2023-03-17 PROCEDURE — 88305 TISSUE EXAM BY PATHOLOGIST: ICD-10-PCS | Mod: 26,,, | Performed by: STUDENT IN AN ORGANIZED HEALTH CARE EDUCATION/TRAINING PROGRAM

## 2023-03-17 PROCEDURE — 88305 TISSUE EXAM BY PATHOLOGIST: CPT | Mod: 26,,, | Performed by: STUDENT IN AN ORGANIZED HEALTH CARE EDUCATION/TRAINING PROGRAM

## 2023-03-17 PROCEDURE — 88342 CHG IMMUNOCYTOCHEMISTRY: ICD-10-PCS | Mod: 26,,, | Performed by: STUDENT IN AN ORGANIZED HEALTH CARE EDUCATION/TRAINING PROGRAM

## 2023-03-17 PROCEDURE — 25000242 PHARM REV CODE 250 ALT 637 W/ HCPCS: Performed by: ANESTHESIOLOGY

## 2023-03-17 PROCEDURE — 43251 EGD REMOVE LESION SNARE: CPT | Mod: 51,,, | Performed by: INTERNAL MEDICINE

## 2023-03-17 PROCEDURE — 43239 PR EGD, FLEX, W/BIOPSY, SGL/MULTI: ICD-10-PCS | Mod: 59,,, | Performed by: INTERNAL MEDICINE

## 2023-03-17 PROCEDURE — D9220A PRA ANESTHESIA: Mod: 33,ANES,, | Performed by: ANESTHESIOLOGY

## 2023-03-17 PROCEDURE — 45385 COLONOSCOPY W/LESION REMOVAL: CPT | Mod: PT | Performed by: INTERNAL MEDICINE

## 2023-03-17 PROCEDURE — 43251 EGD REMOVE LESION SNARE: CPT | Performed by: INTERNAL MEDICINE

## 2023-03-17 PROCEDURE — 88342 IMHCHEM/IMCYTCHM 1ST ANTB: CPT | Performed by: STUDENT IN AN ORGANIZED HEALTH CARE EDUCATION/TRAINING PROGRAM

## 2023-03-17 PROCEDURE — 45380 PR COLONOSCOPY,BIOPSY: ICD-10-PCS | Mod: 33,59,, | Performed by: INTERNAL MEDICINE

## 2023-03-17 PROCEDURE — 25000003 PHARM REV CODE 250: Performed by: INTERNAL MEDICINE

## 2023-03-17 PROCEDURE — 43239 EGD BIOPSY SINGLE/MULTIPLE: CPT | Mod: 59,,, | Performed by: INTERNAL MEDICINE

## 2023-03-17 PROCEDURE — 45385 PR COLONOSCOPY,REMV LESN,SNARE: ICD-10-PCS | Mod: 33,22,, | Performed by: INTERNAL MEDICINE

## 2023-03-17 PROCEDURE — 37000009 HC ANESTHESIA EA ADD 15 MINS: Performed by: INTERNAL MEDICINE

## 2023-03-17 PROCEDURE — 45380 COLONOSCOPY AND BIOPSY: CPT | Mod: 33,59,, | Performed by: INTERNAL MEDICINE

## 2023-03-17 PROCEDURE — 00813 ANES UPR LWR GI NDSC PX: CPT | Performed by: INTERNAL MEDICINE

## 2023-03-17 PROCEDURE — 63600175 PHARM REV CODE 636 W HCPCS: Performed by: STUDENT IN AN ORGANIZED HEALTH CARE EDUCATION/TRAINING PROGRAM

## 2023-03-17 PROCEDURE — 27201012 HC FORCEPS, HOT/COLD, DISP: Performed by: INTERNAL MEDICINE

## 2023-03-17 PROCEDURE — 94640 AIRWAY INHALATION TREATMENT: CPT | Mod: 59

## 2023-03-17 PROCEDURE — 37000008 HC ANESTHESIA 1ST 15 MINUTES: Performed by: INTERNAL MEDICINE

## 2023-03-17 PROCEDURE — D9220A PRA ANESTHESIA: ICD-10-PCS | Mod: 33,ANES,, | Performed by: ANESTHESIOLOGY

## 2023-03-17 PROCEDURE — 27201089 HC SNARE, DISP (ANY): Performed by: INTERNAL MEDICINE

## 2023-03-17 PROCEDURE — 88342 IMHCHEM/IMCYTCHM 1ST ANTB: CPT | Mod: 26,,, | Performed by: STUDENT IN AN ORGANIZED HEALTH CARE EDUCATION/TRAINING PROGRAM

## 2023-03-17 PROCEDURE — 27000221 HC OXYGEN, UP TO 24 HOURS

## 2023-03-17 PROCEDURE — 45385 COLONOSCOPY W/LESION REMOVAL: CPT | Mod: 33,22,, | Performed by: INTERNAL MEDICINE

## 2023-03-17 PROCEDURE — 43239 EGD BIOPSY SINGLE/MULTIPLE: CPT | Mod: 59 | Performed by: INTERNAL MEDICINE

## 2023-03-17 RX ORDER — LEVALBUTEROL 1.25 MG/.5ML
1.25 SOLUTION, CONCENTRATE RESPIRATORY (INHALATION) ONCE
Status: COMPLETED | OUTPATIENT
Start: 2023-03-17 | End: 2023-03-17

## 2023-03-17 RX ORDER — SODIUM CHLORIDE 9 MG/ML
INJECTION, SOLUTION INTRAVENOUS CONTINUOUS
Status: DISCONTINUED | OUTPATIENT
Start: 2023-03-17 | End: 2023-03-17 | Stop reason: HOSPADM

## 2023-03-17 RX ORDER — PROPOFOL 10 MG/ML
VIAL (ML) INTRAVENOUS
Status: DISCONTINUED | OUTPATIENT
Start: 2023-03-17 | End: 2023-03-17

## 2023-03-17 RX ORDER — ONDANSETRON 2 MG/ML
INJECTION INTRAMUSCULAR; INTRAVENOUS
Status: DISCONTINUED | OUTPATIENT
Start: 2023-03-17 | End: 2023-03-17

## 2023-03-17 RX ORDER — LIDOCAINE HCL/PF 100 MG/5ML
SYRINGE (ML) INTRAVENOUS
Status: DISCONTINUED | OUTPATIENT
Start: 2023-03-17 | End: 2023-03-17

## 2023-03-17 RX ADMIN — LEVALBUTEROL 1.25 MG: 1.25 SOLUTION, CONCENTRATE RESPIRATORY (INHALATION) at 01:03

## 2023-03-17 RX ADMIN — PROPOFOL 20 MG: 10 INJECTION, EMULSION INTRAVENOUS at 01:03

## 2023-03-17 RX ADMIN — PROPOFOL 20 MG: 10 INJECTION, EMULSION INTRAVENOUS at 02:03

## 2023-03-17 RX ADMIN — LIDOCAINE HYDROCHLORIDE 140 MG: 20 INJECTION INTRAVENOUS at 01:03

## 2023-03-17 RX ADMIN — PROPOFOL 10 MG: 10 INJECTION, EMULSION INTRAVENOUS at 01:03

## 2023-03-17 RX ADMIN — PROPOFOL 50 MG: 10 INJECTION, EMULSION INTRAVENOUS at 01:03

## 2023-03-17 RX ADMIN — GLYCOPYRROLATE 0.1 MG: 0.2 INJECTION, SOLUTION INTRAMUSCULAR; INTRAVITREAL at 01:03

## 2023-03-17 RX ADMIN — SODIUM CHLORIDE: 9 INJECTION, SOLUTION INTRAVENOUS at 12:03

## 2023-03-17 RX ADMIN — ONDANSETRON 4 MG: 2 INJECTION INTRAMUSCULAR; INTRAVENOUS at 02:03

## 2023-03-17 RX ADMIN — PROPOFOL 30 MG: 10 INJECTION, EMULSION INTRAVENOUS at 02:03

## 2023-03-17 NOTE — H&P
CC: GERD, history of polyps    77 year old male with above. States that symptoms are stable, no alleviating/exacerbating factors. No family history of colorectal CA. Positive personal history of polyps. No bleeding or weight loss.     ROS:  No headache, no fever/chills, no chest pain/SOB, no nausea/vomiting/diarrhea/constipation/GI bleeding/abdominal pain, no dysuria/hematuria.    VSSAF   Exam:   Alert and oriented x 3; no apparent distress   PERRLA, sclera anicteric  CV: Regular rate/rhythm, normal PMI   Lungs: Clear bilaterally with no wheeze/rales   Abdomen: Soft, NT/ND, normal bowel sounds   Ext: No cyanosis, clubbing     Impression:   As above    Plan:   Proceed with endoscopy. Further recs to follow.

## 2023-03-17 NOTE — PROVATION PATIENT INSTRUCTIONS
Discharge Summary/Instructions after an Endoscopic Procedure  Patient Name: Jordy Jeffers  Patient MRN: [id number]  Patient YOB: 1945 Friday, March 17, 2023  Rene Wei MD  Dear patient,  As a result of recent federal legislation (The Federal Cures Act), you may   receive lab or pathology results from your procedure in your [facility]   account before your physician is able to contact you. Your physician or   their representative will relay the results to you with their   recommendations at their soonest availability.  Thank you,  RESTRICTIONS:  During your procedure today, you received medications for sedation.  These   medications may affect your judgment, balance and coordination.  Therefore,   for 24 hours, you have the following restrictions:   - DO NOT drive a car, operate machinery, make legal/financial decisions,   sign important papers or drink alcohol.    ACTIVITY:  Today: no heavy lifting, straining or running due to procedural   sedation/anesthesia.  The following day: return to full activity including work.  DIET:  Eat and drink normally unless instructed otherwise.     TREATMENT FOR COMMON SIDE EFFECTS:  - Mild abdominal pain, nausea, belching, bloating or excessive gas:  rest,   eat lightly and use a heating pad.  - Sore Throat: treat with throat lozenges and/or gargle with warm salt   water.  - Because air was used during the procedure, expelling large amounts of air   from your rectum or belching is normal.  - If a bowel prep was taken, you may not have a bowel movement for 1-3 days.    This is normal.  SYMPTOMS TO WATCH FOR AND REPORT TO YOUR PHYSICIAN:  1. Abdominal pain or bloating, other than gas cramps.  2. Chest pain.  3. Back pain.  4. Signs of infection such as: chills or fever occurring within 24 hours   after the procedure.  5. Rectal bleeding, which would show as bright red, maroon, or black stools.   (A tablespoon of blood from the rectum is not serious, especially if    hemorrhoids are present.)  6. Vomiting.  7. Weakness or dizziness.  GO DIRECTLY TO THE NEAREST EMERGENCY ROOM IF YOU HAVE ANY OF THE FOLLOWING:      Difficulty breathing              Chills and/or fever over 101 F   Persistent vomiting and/or vomiting blood   Severe abdominal pain   Severe chest pain   Black, tarry stools   Bleeding- more than one tablespoon   Any other symptom or condition that you feel may need urgent attention  Your doctor recommends these additional instructions:  If any biopsies were taken, your doctors clinic will contact you in 1 to 2   weeks with any results.  - Patient has a contact number available for emergencies.  The signs and   symptoms of potential delayed complications were discussed with the   patient.  Return to normal activities tomorrow.  Written discharge   instructions were provided to the patient.   - High fiber diet.   - Continue present medications.   - Await pathology results.   - Resume Plavix (clopidogrel) at prior dose today.   - Repeat colonoscopy in 3 years for surveillance.   - Discharge patient to home (ambulatory).   - Return to GI office PRN.  For questions, problems or results please call your physician - Rene Wei MD at Work:  [phone].  [facility], EMERGENCY ROOM PHONE NUMBER: [phone]  IF A COMPLICATION OR EMERGENCY SITUATION ARISES AND YOU ARE UNABLE TO REACH   YOUR PHYSICIAN - GO DIRECTLY TO THE EMERGENCY ROOM.  Rene Wei MD  3/17/2023 2:31:41 PM  This report has been verified and signed electronically.  Dear patient,  As a result of recent federal legislation (The Federal Cures Act), you may   receive lab or pathology results from your procedure in your [facility]   account before your physician is able to contact you. Your physician or   their representative will relay the results to you with their   recommendations at their soonest availability.  Thank you,  PROVATION

## 2023-03-17 NOTE — ANESTHESIA POSTPROCEDURE EVALUATION
Anesthesia Post Evaluation    Patient: Jordy Jeffers    Procedure(s) Performed: Procedure(s) (LRB):  EGD (ESOPHAGOGASTRODUODENOSCOPY) Double d/t O2 therapy per NP (N/A)  COLONOSCOPY (N/A)    Final Anesthesia Type: general      Patient location during evaluation: PACU  Patient participation: Yes- Able to Participate  Level of consciousness: awake and alert  Post-procedure vital signs: reviewed and stable  Pain management: adequate  Airway patency: patent    PONV status at discharge: No PONV  Anesthetic complications: no      Cardiovascular status: hemodynamically stable  Respiratory status: unassisted and room air  Hydration status: euvolemic  Follow-up not needed.          Vitals Value Taken Time   /71 03/17/23 1447     03/17/23 1453   Pulse 92 03/17/23 1448   Resp 20 03/17/23 1447   SpO2 96 % 03/17/23 1448   Vitals shown include unvalidated device data.      No case tracking events are documented in the log.      Pain/Corby Score: Corby Score: 9 (3/17/2023  2:47 PM)

## 2023-03-17 NOTE — PROVATION PATIENT INSTRUCTIONS
Discharge Summary/Instructions after an Endoscopic Procedure  Patient Name: Jordy Jeffers  Patient MRN: [id number]  Patient YOB: 1945 Friday, March 17, 2023  Rene Wei MD  Dear patient,  As a result of recent federal legislation (The Federal Cures Act), you may   receive lab or pathology results from your procedure in your [facility]   account before your physician is able to contact you. Your physician or   their representative will relay the results to you with their   recommendations at their soonest availability.  Thank you,  RESTRICTIONS:  During your procedure today, you received medications for sedation.  These   medications may affect your judgment, balance and coordination.  Therefore,   for 24 hours, you have the following restrictions:   - DO NOT drive a car, operate machinery, make legal/financial decisions,   sign important papers or drink alcohol.    ACTIVITY:  Today: no heavy lifting, straining or running due to procedural   sedation/anesthesia.  The following day: return to full activity including work.  DIET:  Eat and drink normally unless instructed otherwise.     TREATMENT FOR COMMON SIDE EFFECTS:  - Mild abdominal pain, nausea, belching, bloating or excessive gas:  rest,   eat lightly and use a heating pad.  - Sore Throat: treat with throat lozenges and/or gargle with warm salt   water.  - Because air was used during the procedure, expelling large amounts of air   from your rectum or belching is normal.  - If a bowel prep was taken, you may not have a bowel movement for 1-3 days.    This is normal.  SYMPTOMS TO WATCH FOR AND REPORT TO YOUR PHYSICIAN:  1. Abdominal pain or bloating, other than gas cramps.  2. Chest pain.  3. Back pain.  4. Signs of infection such as: chills or fever occurring within 24 hours   after the procedure.  5. Rectal bleeding, which would show as bright red, maroon, or black stools.   (A tablespoon of blood from the rectum is not serious, especially if    hemorrhoids are present.)  6. Vomiting.  7. Weakness or dizziness.  GO DIRECTLY TO THE NEAREST EMERGENCY ROOM IF YOU HAVE ANY OF THE FOLLOWING:      Difficulty breathing              Chills and/or fever over 101 F   Persistent vomiting and/or vomiting blood   Severe abdominal pain   Severe chest pain   Black, tarry stools   Bleeding- more than one tablespoon   Any other symptom or condition that you feel may need urgent attention  Your doctor recommends these additional instructions:  If any biopsies were taken, your doctors clinic will contact you in 1 to 2   weeks with any results.  - Patient has a contact number available for emergencies.  The signs and   symptoms of potential delayed complications were discussed with the   patient.  Return to normal activities tomorrow.  Written discharge   instructions were provided to the patient.   - Resume previous diet.   - Continue present medications.   - No aspirin, ibuprofen, naproxen, or other non-steroidal anti-inflammatory   drugs.   - Resume Plavix (clopidogrel) at prior dose today.   - Await pathology results.   - Discharge patient to home (ambulatory).   - Follow an antireflux regimen.   - Perform a colonoscopy today.   - Return to GI office after studies are complete.  For questions, problems or results please call your physician - Rene Wei MD at Work:  [phone].  [facility], EMERGENCY ROOM PHONE NUMBER: [phone]  IF A COMPLICATION OR EMERGENCY SITUATION ARISES AND YOU ARE UNABLE TO REACH   YOUR PHYSICIAN - GO DIRECTLY TO THE EMERGENCY ROOM.  Rene Wei MD  3/17/2023 2:04:05 PM  This report has been verified and signed electronically.  Dear patient,  As a result of recent federal legislation (The Federal Cures Act), you may   receive lab or pathology results from your procedure in your [facility]   account before your physician is able to contact you. Your physician or   their representative will relay the results to you with their    recommendations at their soonest availability.  Thank you,  PROVATION

## 2023-03-17 NOTE — PLAN OF CARE
Vss, marcia po fluids, denies pain, ambulates easily. IV removed, catheter intact. Discharge instructions provided and states understanding. States ready to go home.  Discharged from facility with family per wheelchair.

## 2023-03-17 NOTE — TRANSFER OF CARE
"Anesthesia Transfer of Care Note    Patient: Jordy Jeffers    Procedure(s) Performed: Procedure(s) (LRB):  EGD (ESOPHAGOGASTRODUODENOSCOPY) Double d/t O2 therapy per NP (N/A)  COLONOSCOPY (N/A)    Patient location: GI    Anesthesia Type: general    Transport from OR: Transported from OR on room air with adequate spontaneous ventilation    Post pain: adequate analgesia    Post assessment: no apparent anesthetic complications    Post vital signs: stable    Level of consciousness: awake and alert    Nausea/Vomiting: no nausea/vomiting    Complications: none    Transfer of care protocol was followed      Last vitals:   Visit Vitals  BP (!) 194/85 (BP Location: Left arm, Patient Position: Lying)   Pulse 74   Temp 36.8 °C (98.2 °F) (Temporal)   Resp 19   Ht 6' 2" (1.88 m)   Wt (!) 163.3 kg (360 lb)   SpO2 99%   BMI 46.22 kg/m²     "

## 2023-03-20 ENCOUNTER — OFFICE VISIT (OUTPATIENT)
Dept: PULMONOLOGY | Facility: CLINIC | Age: 78
End: 2023-03-20
Payer: OTHER GOVERNMENT

## 2023-03-20 VITALS
OXYGEN SATURATION: 91 % | SYSTOLIC BLOOD PRESSURE: 142 MMHG | TEMPERATURE: 98 F | BODY MASS INDEX: 46.58 KG/M2 | DIASTOLIC BLOOD PRESSURE: 78 MMHG | WEIGHT: 315 LBS | HEART RATE: 78 BPM

## 2023-03-20 DIAGNOSIS — J96.11 CHRONIC HYPOXEMIC RESPIRATORY FAILURE: ICD-10-CM

## 2023-03-20 DIAGNOSIS — G47.33 OSA (OBSTRUCTIVE SLEEP APNEA): ICD-10-CM

## 2023-03-20 DIAGNOSIS — J44.9 CHRONIC OBSTRUCTIVE PULMONARY DISEASE, UNSPECIFIED COPD TYPE: Primary | ICD-10-CM

## 2023-03-20 PROCEDURE — 99213 OFFICE O/P EST LOW 20 MIN: CPT | Mod: S$GLB,,, | Performed by: NURSE PRACTITIONER

## 2023-03-20 PROCEDURE — 99213 PR OFFICE/OUTPT VISIT, EST, LEVL III, 20-29 MIN: ICD-10-PCS | Mod: S$GLB,,, | Performed by: NURSE PRACTITIONER

## 2023-03-20 NOTE — PROGRESS NOTES
SUBJECTIVE:    Patient ID: Jordy Jeffers is a 77 y.o. male.    Chief Complaint: Follow-up and COPD (6 month follow up COPD)      Patient here today feeling well.  He is wearing his oxygen all of the time and sleeping on his BIPAP with oxygen bled in.  He is using Wixela twice a day. Still takes Tessalon pearls for cough. He had a recent EGD which showed moderate hiatal hernia, gastritis, and polyps.  He is taking Protonix daily, he has not lost weight.  He uses Duoneb as needed.   Past Medical History:   Diagnosis Date    CAD (coronary artery disease)     Chronic hypoxemic respiratory failure     Colon polyps     COPD (chronic obstructive pulmonary disease)     Diastolic heart failure     Diverticulosis     DM (diabetes mellitus)     GERD (gastroesophageal reflux disease)     HTN (hypertension)     Hypercholesterolemia     Lung disease     Obesity     SYBIL (obstructive sleep apnea)      Past Surgical History:   Procedure Laterality Date    COLONOSCOPY  02/06/2019    Dr. Gonzalez, sent for scanning: diverticulosis, hemorrhoids, more than 6 polyps removed; biopsy: tubular adenomas and hyperplastic polyp; repeat in 3-5 years for surveillance    COLONOSCOPY N/A 3/17/2023    Procedure: COLONOSCOPY;  Surgeon: Rene Barrios MD;  Location: [facility];  Service: Endoscopy;  Laterality: N/A;    coronary artery stent      ESOPHAGOGASTRODUODENOSCOPY N/A 3/17/2023    Procedure: EGD (ESOPHAGOGASTRODUODENOSCOPY) Double d/t O2 therapy per NP;  Surgeon: Rene Barrios MD;  Location: [facility] ENDO;  Service: Endoscopy;  Laterality: N/A;    r cataract resected      UPPER GASTROINTESTINAL ENDOSCOPY  02/06/2019    Dr. Gonzalez, sent for scanning: candida esophagitis; hiatal hernia, gastgritis, duodenitis; biopsy: GEJ- reflux, negative for ramires's; stomach chronic inactive inflammation    warfin tumor removed        Family History   Problem Relation Age of Onset    Heart failure Mother     Coronary artery disease Father     Colon  cancer Neg Hx     Esophageal cancer Neg Hx     Stomach cancer Neg Hx         Social History:   Marital Status:   Occupation: retired [employer] [facility] [place]  Alcohol History:  reports that he does not currently use alcohol.  Tobacco History:  reports that he quit smoking about 20 years ago. His smoking use included cigarettes. He has a 60.00 pack-year smoking history. He quit smokeless tobacco use about 19 years ago.  Drug History:  reports no history of drug use.    Review of patient's allergies indicates:   Allergen Reactions    Metformin        Current Outpatient Medications   Medication Sig Dispense Refill    albuterol (PROVENTIL/VENTOLIN HFA) 90 mcg/actuation inhaler INHALE 2 PUFFS BY MOUTH EVERY 4-6 HOURS AS NEEDED AS A RESCUE INHALER      amLODIPine (NORVASC) 10 MG tablet Take 10 mg by mouth once daily.      atorvastatin (LIPITOR) 80 MG tablet Take 80 mg by mouth once daily.      benzonatate (TESSALON) 200 MG capsule TAKE ONE CAPSULE BY MOUTH THREE TIMES A DAY FOR COUGH      budesonide-formoterol 160-4.5 mcg (SYMBICORT) 160-4.5 mcg/actuation HFAA Inhale 2 puffs into the lungs every 12 (twelve) hours. Controller      bumetanide (BUMEX) 1 MG tablet Take 3 mg by mouth 2 (two) times daily.       clopidogrel (PLAVIX) 75 mg tablet Take 75 mg by mouth once daily.      empagliflozin (JARDIANCE) 25 mg tablet Take 1 tablet by mouth every morning.      fluticasone (FLONASE) 50 mcg/actuation nasal spray 1 spray by Each Nare route once daily.      fluticasone-salmeterol diskus inhaler 250-50 mcg WILL START TAKING WHEN OUT OF SYMBICORT WHICH SHOULD BE AROUND 10/22      insulin aspart U-100 (NOVOLOG) 100 unit/mL Crtg Inject 10 Units into the skin.      losartan (COZAAR) 100 MG tablet Take 100 mg by mouth once daily.      metoprolol succinate (TOPROL-XL) 50 MG 24 hr tablet Take 50 mg by mouth once daily.       nitroGLYCERIN (NITROSTAT) 0.4 MG SL tablet DISSOLVE ONE TABLET UNDER TONGUE EVERY 5 MINUTES FOR 3 DOSES-IF NO RELIEF, GO TO ER  AS NEEDED FOR CHEST PAIN      pantoprazole (PROTONIX) 40 MG tablet Take 40 mg by mouth once daily.      potassium chloride (KLOR-CON) 10 MEQ TbSR Take 10 mEq by mouth 2 (two) times daily.       spironolactone (ALDACTONE) 25 MG tablet Take 25 mg by mouth.      terazosin (HYTRIN) 2 MG capsule Take 2 mg by mouth every evening.      tiotropium bromide (SPIRIVA RESPIMAT) 2.5 mcg/actuation Mist Inhale 2 puffs into the lungs once daily. Controller 3 g 6    triamcinolone acetonide 0.1% (KENALOG) 0.1 % cream APPLY SMALL AMOUNT TOPICALLY TWICE A DAY AS NEEDED      ammonium lactate (LAC-HYDRIN) 12 % lotion by intratympanic route.      losartan (COZAAR) 50 MG tablet Take 50 mg by mouth once daily.      NIFEdipine (ADALAT CC) 60 MG TbSR Take 30 mg by mouth once daily.       No current facility-administered medications for this visit.       Last PFT:  06/30/2021  Moderate obstruction, moderate restriction, and severe diffusion defect  Last Chest xray:  07/16/2021  Clear lung fields    Review of Systems  General: feeling well  Eyes: Vision is good.  ENT:  No sinusitis or pharyngitis.   Heart:: No chest pain or palpitations.  Lungs: breathing is stable, does cough throughout the day  GI: GERD  : No dysuria, hesitancy, or nocturia.  Musculoskeletal:  chronic back pain   Skin: No lesions or rashes.  Neuro: No headaches or neuropathy.  Lymph: swelling to legs  Psych: No anxiety or depression.  Endo: weight is stable     OBJECTIVE:      BP (!) 142/78 (BP Location: Right arm, Patient Position: Sitting, BP Method: Large (Manual))   Pulse 78   Temp 97.6 °F (36.4 °C)   Wt (!) 164.6 kg (362 lb 12.8 oz)   SpO2 (!) 91% Comment: On 4 liters Oxygen  BMI 46.58 kg/m²     Physical Exam  GENERAL: Older patient in no distress.  HEENT: Pupils equal and reactive. Extraocular movements intact. Nose intact.  Pharynx moist.  NECK: Supple.   HEART: Regular rate and rhythm. No murmur or gallop auscultated.  LUNGS: decreased throughout  .  ABDOMEN:morbidly obese,  Bowel sounds present. Non-tender, no masses palpated.  EXTREMITIES: Normal muscle tone and joint movement, no cyanosis or clubbing. Wheelchair.  LYMPHATICS: 2+ pitting edema to legs   SKIN: Dry, intact, no lesions.   NEURO: Cranial nerves II-XII intact. Motor strength 5/5 bilaterally, upper and lower extremities.  PSYCH: Appropriate affect.        Assessment:       1. Chronic obstructive pulmonary disease, unspecified COPD type    2. SYBIL (obstructive sleep apnea)    3. Chronic hypoxemic respiratory failure              Plan:       Chronic obstructive pulmonary disease, unspecified COPD type  -     X-Ray Chest PA And Lateral; Future  -     Complete PFT with bronchodilator; Future    SYBIL (obstructive sleep apnea)    Chronic hypoxemic respiratory failure           Continue Wixila 1 puff twice a day   Oxygen all the time  Bipap with sleep, bleed oxygen in   Continue the duoneb(albuterol and ipratropium in nebulizer) every 4-6 hours  Keep head of bed elevated, no eating or drinking at least an 1-2 hours before bed  Fax scripts  to [phone]  Chest xray and PFT   Need to lose weight.   Follow up in about 6 months (around 9/20/2023).

## 2023-03-20 NOTE — PATIENT INSTRUCTIONS
Continue Wixila 1 puff twice a day   Oxygen all the time  Bipap with sleep, bleed oxygen in   Continue the duoneb(albuterol and ipratropium in nebulizer) every 4-6 hours  Keep head of bed elevated, no eating or drinking at least an 1-2 hours before bed  Fax scripts  to [phone]  Chest xray and PFT

## 2023-03-22 LAB
FINAL PATHOLOGIC DIAGNOSIS: NORMAL
GROSS: NORMAL
Lab: NORMAL
SUPPLEMENTAL DIAGNOSIS: NORMAL

## 2023-03-22 NOTE — PROGRESS NOTES
Please notify patient that biopsies reviewed and showed no bacteria.  Continue current meds and follow up as previously planned.    Please advise patient that polyp pathology was reviewed and is benign and is the adenomatous type which is precancerous/risk factor for cancer.  Repeat colonoscopy recommended in 3 years.  Place reminder in system for repeat colonoscopy.

## 2023-03-23 NOTE — PROGRESS NOTES
Please notify patient that biopsies reviewed and showed no bacteria.  Continue current meds and follow up as previously planned. Stomach polyps were benign.    Please advise patient that polyp pathology was reviewed and is benign and is the adenomatous type which is precancerous/risk factor for cancer.  Repeat colonoscopy recommended in 3 years.  Place reminder in system for repeat colonoscopy.

## 2023-04-05 ENCOUNTER — TELEPHONE (OUTPATIENT)
Dept: GASTROENTEROLOGY | Facility: CLINIC | Age: 78
End: 2023-04-05
Payer: OTHER GOVERNMENT

## 2023-04-05 NOTE — TELEPHONE ENCOUNTER
----- Message from [place] sent at 4/5/2023  3:33 PM CDT -----  Regarding: Results  Name of Who is Calling: NAHID QUEZADA [[id number]]      What is the request in detail: Pt received a letter to call to get colonoscopy results. Please advise.      Can the clinic reply by [facility]: no       What [facility] to [facility] Back [facility]: [phone]

## 2023-04-21 ENCOUNTER — TELEPHONE (OUTPATIENT)
Dept: PULMONOLOGY | Facility: CLINIC | Age: 78
End: 2023-04-21

## 2023-04-21 NOTE — TELEPHONE ENCOUNTER
Patients wife called said her  needs a written RX for Al[facility]ol Rescue In[facility].  She could come  RX if written.  Please advise.

## 2023-05-04 ENCOUNTER — HOSPITAL ENCOUNTER (OUTPATIENT)
Dept: PULMONOLOGY | Facility: HOSPITAL | Age: 78
Discharge: HOME | End: 2023-05-04
Attending: NURSE PRACTITIONER
Payer: OTHER GOVERNMENT

## 2023-05-04 DIAGNOSIS — J44.9 CHRONIC OBSTRUCTIVE PULMONARY DISEASE, UNSPECIFIED COPD TYPE: ICD-10-CM

## 2023-05-04 PROCEDURE — 94010 BREATHING CAPACITY TEST: CPT

## 2023-05-04 PROCEDURE — 94729 DIFFUSING CAPACITY: CPT

## 2023-05-04 PROCEDURE — 94727 GAS DIL/WSHOT DETER LNG VOL: CPT

## 2023-05-04 PROCEDURE — 94060 EVALUATION OF WHEEZING: CPT | Mod: XB

## 2023-05-08 ENCOUNTER — TELEPHONE (OUTPATIENT)
Dept: PULMONOLOGY | Facility: CLINIC | Age: 78
End: 2023-05-08

## 2023-05-08 NOTE — TELEPHONE ENCOUNTER
PFT shows severe  obstruction with good response to bronchodilator in small airway, mild restriction, severe diffusion defect

## 2023-05-10 NOTE — TELEPHONE ENCOUNTER
Spoke to the patients wife. The patient has not been using his wixela or spiriva.  He will start using

## 2023-08-01 ENCOUNTER — TELEPHONE (OUTPATIENT)
Dept: PULMONOLOGY | Facility: CLINIC | Age: 78
End: 2023-08-01

## 2023-08-01 NOTE — LETTER
[facility] - Pulmonology  [address]  [place] [address]  Phone: [phone]  Fax: [phone] August 1, 2023     Pipe Jeffers  [address] [place] [address]    Patient: Jordy Jeffers   MR Number: [id number]   YOB: 1945   Date of Visit: 8/1/2023       To Whom this May Concern:     Jordy Jeffers is a patient of mine that I treat for COPD and Chronic hypoxemic respiratory failure. He is oxygen dependent and requires CPAP at night when he is sleeping as well. In the event of a power outage he needs to be on priority list for restoration.       Sincerely,                           LAY Caba

## 2023-08-01 NOTE — TELEPHONE ENCOUNTER
PT needs a letter for electric company saying pt is on o2 and in case of power outage he needs to be on the list to be fixed or how ever you word that.

## 2023-09-11 RX ORDER — IPRATROPIUM BROMIDE AND ALBUTEROL SULFATE 2.5; .5 MG/3ML; MG/3ML
3 SOLUTION RESPIRATORY (INHALATION) 3 TIMES DAILY
COMMUNITY
Start: 2022-11-15

## 2023-09-19 ENCOUNTER — TELEPHONE (OUTPATIENT)
Dept: PULMONOLOGY | Facility: CLINIC | Age: 78
End: 2023-09-19

## 2023-09-19 ENCOUNTER — OFFICE VISIT (OUTPATIENT)
Dept: PULMONOLOGY | Facility: CLINIC | Age: 78
End: 2023-09-19
Payer: OTHER GOVERNMENT

## 2023-09-19 ENCOUNTER — HOSPITAL ENCOUNTER (OUTPATIENT)
Dept: RADIOLOGY | Facility: HOSPITAL | Age: 78
Discharge: HOME | End: 2023-09-19
Attending: NURSE PRACTITIONER
Payer: OTHER GOVERNMENT

## 2023-09-19 VITALS
HEART RATE: 89 BPM | DIASTOLIC BLOOD PRESSURE: 76 MMHG | WEIGHT: 315 LBS | BODY MASS INDEX: 46.22 KG/M2 | SYSTOLIC BLOOD PRESSURE: 148 MMHG | OXYGEN SATURATION: 90 %

## 2023-09-19 DIAGNOSIS — J44.9 CHRONIC OBSTRUCTIVE PULMONARY DISEASE, UNSPECIFIED COPD TYPE: Primary | ICD-10-CM

## 2023-09-19 DIAGNOSIS — J96.11 CHRONIC HYPOXEMIC RESPIRATORY FAILURE: ICD-10-CM

## 2023-09-19 DIAGNOSIS — R05.3 CHRONIC COUGH: ICD-10-CM

## 2023-09-19 DIAGNOSIS — E66.01 MORBID OBESITY WITH BODY MASS INDEX OF 40.0-44.9 IN ADULT: ICD-10-CM

## 2023-09-19 DIAGNOSIS — J44.9 CHRONIC OBSTRUCTIVE PULMONARY DISEASE, UNSPECIFIED COPD TYPE: ICD-10-CM

## 2023-09-19 DIAGNOSIS — G47.33 OSA (OBSTRUCTIVE SLEEP APNEA): ICD-10-CM

## 2023-09-19 PROCEDURE — 99214 OFFICE O/P EST MOD 30 MIN: CPT | Mod: S$GLB,,, | Performed by: NURSE PRACTITIONER

## 2023-09-19 PROCEDURE — 99214 PR OFFICE/OUTPT VISIT, EST, LEVL IV, 30-39 MIN: ICD-10-PCS | Mod: S$GLB,,, | Performed by: NURSE PRACTITIONER

## 2023-09-19 PROCEDURE — 71046 X-RAY EXAM CHEST 2 VIEWS: CPT | Mod: TC

## 2023-09-19 RX ORDER — ARFORMOTEROL TARTRATE 15 UG/2ML
15 SOLUTION RESPIRATORY (INHALATION) 2 TIMES DAILY
Qty: 60 EACH | Refills: 6 | Status: SHIPPED | OUTPATIENT
Start: 2023-09-19 | End: 2024-09-18

## 2023-09-19 RX ORDER — BUDESONIDE 0.5 MG/2ML
0.5 INHALANT ORAL 2 TIMES DAILY
Qty: 120 ML | Refills: 5 | Status: SHIPPED | OUTPATIENT
Start: 2023-09-19 | End: 2024-09-18

## 2023-09-19 NOTE — TELEPHONE ENCOUNTER
Chest xray  IMPRESSION:  Hyperexpanded lungs compatible with COPD     Mild cardiomegaly     No acute pulmonary process

## 2023-09-19 NOTE — PROGRESS NOTES
SUBJECTIVE:    Patient ID: Jordy Jeffers is a 78 y.o. male.    Chief Complaint: Follow-up and COPD (6 month follow up COPD)      Patient here today feeling alright.  He is wearing his oxygen all of the time and sleeping on his BIPAP with oxygen bled in.  He is using Wixela twice a day and duoneb 3 times a day. He did not have the chest xray that was ordered last visit. He does still cough. He has not lost weight.  His PFT shows severe  obstruction with good response to bronchodilator in small airway, mild restriction, severe diffusion defect.  The patient does not appear to be able to appropriately inhale the Wixela or effectively.    Past Medical History:   Diagnosis Date    CAD (coronary artery disease)     Chronic hypoxemic respiratory failure     Colon polyps     COPD (chronic obstructive pulmonary disease)     Diastolic heart failure     Diverticulosis     DM (diabetes mellitus)     GERD (gastroesophageal reflux disease)     HTN (hypertension)     Hypercholesterolemia     Lung disease     Obesity     SYBIL (obstructive sleep apnea)      Past Surgical History:   Procedure Laterality Date    COLONOSCOPY  02/06/2019    Dr. Gonzalez, sent for scanning: diverticulosis, hemorrhoids, more than 6 polyps removed; biopsy: tubular adenomas and hyperplastic polyp; repeat in 3-5 years for surveillance    COLONOSCOPY N/A 3/17/2023    Procedure: COLONOSCOPY;  Surgeon: Rene Barrios MD;  Location: [facility];  Service: Endoscopy;  Laterality: N/A;    coronary artery stent      ESOPHAGOGASTRODUODENOSCOPY N/A 3/17/2023    Procedure: EGD (ESOPHAGOGASTRODUODENOSCOPY) Double d/t O2 therapy per NP;  Surgeon: Rene Barrios MD;  Location: [facility];  Service: Endoscopy;  Laterality: N/A;    r cataract resected      UPPER GASTROINTESTINAL ENDOSCOPY  02/06/2019    Dr. Gonzalez, sent for scanning: candida esophagitis; hiatal hernia, gastgritis, duodenitis; biopsy: GEJ- reflux, negative for ramires's; stomach chronic inactive  inflammation    warfin tumor removed        Family History   Problem Relation Age of Onset    Heart failure Mother     Coronary artery disease Father     Colon cancer Neg Hx     Esophageal cancer Neg Hx     Stomach cancer Neg Hx         Social History:   Marital Status:   Occupation: retired [employer]  Alcohol History:  reports that he does not currently use alcohol.  Tobacco History:  reports that he quit smoking about 20 years ago. His smoking use included cigarettes. He started smoking about 80 years ago. He has a 60.0 pack-year smoking history. He quit smokeless tobacco use about 19 years ago.  Drug History:  reports no history of drug use.    Review of patient's allergies indicates:   Allergen Reactions    Metformin        Current Outpatient Medications   Medication Sig Dispense Refill    albuterol (PROVENTIL/VENTOLIN HFA) 90 mcg/actuation inhaler INHALE 2 PUFFS BY MOUTH EVERY 4-6 HOURS AS NEEDED AS A RESCUE INHALER      albuterol-ipratropium (DUO-NEB) 2.5 mg-0.5 mg/3 mL nebulizer solution Take 3 mLs by nebulization 3 (three) times daily.      amLODIPine (NORVASC) 10 MG tablet Take 10 mg by mouth once daily.      ammonium lactate (LAC-HYDRIN) 12 % lotion by intratympanic route.      atorvastatin (LIPITOR) 80 MG tablet Take 80 mg by mouth once daily.      bumetanide (BUMEX) 1 MG tablet Take 3 mg by mouth 2 (two) times daily.       clopidogrel (PLAVIX) 75 mg tablet Take 75 mg by mouth once daily.      empagliflozin (JARDIANCE) 25 mg tablet Take 1 tablet by mouth every morning.      fluticasone (FLONASE) 50 mcg/actuation nasal spray 1 spray by Each Nare route once daily.      insulin aspart U-100 (NOVOLOG) 100 unit/mL Crtg Inject 10 Units into the skin.      losartan (COZAAR) 100 MG tablet Take 100 mg by mouth once daily.      metoprolol succinate (TOPROL-XL) 50 MG 24 hr tablet Take 50 mg by mouth once daily.       pantoprazole (PROTONIX) 40 MG tablet Take 40 mg by mouth once daily.      potassium chloride  (KLOR-CON) 10 MEQ TbSR Take 10 mEq by mouth 2 (two) times daily.       spironolactone (ALDACTONE) 25 MG tablet Take 25 mg by mouth.      terazosin (HYTRIN) 2 MG capsule Take 2 mg by mouth every evening.      tiotropium bromide (SPIRIVA RESPIMAT) 2.5 mcg/actuation Mist Inhale 2 puffs into the lungs once daily. Controller 3 g 6    triamcinolone acetonide 0.1% (KENALOG) 0.1 % cream APPLY SMALL AMOUNT TOPICALLY TWICE A DAY AS NEEDED      arformoteroL (BROVANA) 15 mcg/2 mL Nebu Take 2 mLs (15 mcg total) by nebulization 2 (two) times a day. Controller 60 each 6    benzonatate (TESSALON) 200 MG capsule TAKE ONE CAPSULE BY MOUTH THREE TIMES A DAY FOR COUGH      budesonide (PULMICORT) 0.5 mg/2 mL nebulizer solution Take 2 mLs (0.5 mg total) by nebulization 2 (two) times daily. Controller 120 mL 5    losartan (COZAAR) 50 MG tablet Take 50 mg by mouth once daily.      NIFEdipine (ADALAT CC) 60 MG TbSR Take 30 mg by mouth once daily.      nitroGLYCERIN (NITROSTAT) 0.4 MG SL tablet DISSOLVE ONE TABLET UNDER TONGUE EVERY 5 MINUTES FOR 3 DOSES-IF NO RELIEF, GO TO ER AS NEEDED FOR CHEST PAIN       No current facility-administered medications for this visit.       Last PFT:  05/2023  PFT shows severe  obstruction with good response to bronchodilator in small airway, mild restriction, severe diffusion defect    Last Chest xray:  07/16/2021  Clear lung fields    Review of Systems  General: feeling well  Eyes: Vision is good.  ENT:  No sinusitis or pharyngitis.   Heart:: No chest pain or palpitations.  Lungs: breathing is stable, does cough throughout the day  GI: GERD  : No dysuria, hesitancy, or nocturia.  Musculoskeletal:  chronic back pain   Skin: No lesions or rashes.  Neuro: No headaches or neuropathy.  Lymph: swelling to legs  Psych: No anxiety or depression.  Endo: weight is stable     OBJECTIVE:      BP (!) 148/76 (BP Location: Right arm, Patient Position: Sitting, BP Method: Large (Manual))   Pulse 89   Wt (!) 163.3 kg  (360 lb)   SpO2 (!) 90% Comment: On 4 liters of Oxygen  BMI 46.22 kg/m²     Physical Exam  GENERAL: Older patient in no distress.  HEENT: Pupils equal and reactive. Extraocular movements intact. Nose intact.  Pharynx moist.  NECK: Supple.   HEART: Regular rate and rhythm. No murmur or gallop auscultated.  LUNGS: decreased throughout with expiratory wheezing posteriorly  ABDOMEN:morbidly obese,  Bowel sounds present. Non-tender, no masses palpated.  EXTREMITIES: Normal muscle tone and joint movement, no cyanosis or clubbing. Wheelchair.  LYMPHATICS: 2+ pitting edema to legs   SKIN: Dry, intact, no lesions.   NEURO: Cranial nerves II-XII intact. Motor strength 5/5 bilaterally, upper and lower extremities.  PSYCH: Appropriate affect.        Assessment:       1. Chronic obstructive pulmonary disease, unspecified COPD type    2. Chronic hypoxemic respiratory failure    3. SYBIL (obstructive sleep apnea)    4. Morbid obesity with body mass index of 40.0-44.9 in adult    5. Chronic cough                Plan:       Chronic obstructive pulmonary disease, unspecified COPD type  -     X-Ray Chest PA And Lateral; Future    Chronic hypoxemic respiratory failure    SYBIL (obstructive sleep apnea)    Morbid obesity with body mass index of 40.0-44.9 in adult    Chronic cough    Other orders  -     arformoteroL (BROVANA) 15 mcg/2 mL Nebu; Take 2 mLs (15 mcg total) by nebulization 2 (two) times a day. Controller  Dispense: 60 each; Refill: 6  -     budesonide (PULMICORT) 0.5 mg/2 mL nebulizer solution; Take 2 mLs (0.5 mg total) by nebulization 2 (two) times daily. Controller  Dispense: 120 mL; Refill: 5           Discontinue Wixela, discontinue the spiriva   [facility] brovana [facility]patient not effectively able to inhale wixela and his PFT has worsened.   ontinue the duoneb(albuterol and ipratropium in nebulizer) every 4-6 hours  Chest xray   Oxygen all the time  Bipap with sleep, bleed oxygen in   Continue the  duoneb(albuterol and ipratropium in nebulizer) every 4-6 hours  Keep head of bed elevated, no eating or drinking at least an 2 hours before bed      Need to lose weight.   Follow up in about 6 months (around 3/19/2024).

## 2023-09-19 NOTE — PATIENT INSTRUCTIONS
Discontinue Wixela and spiriva   start brovana and [facility]ide [facility] with the [facility]-patient not effectively able to inhale wixela and his PFT has worsened.   ontinue the duoneb(albuterol and ipratropium in nebulizer) every 4-6 hours  Chest xray   Oxygen all the time  Bipap with sleep, bleed oxygen in   Continue the duoneb(albuterol and ipratropium in nebulizer) every 4-6 hours  Keep head of bed elevated, no eating or drinking at least an 2 hours before bed

## 2023-10-01 NOTE — ANESTHESIA PREPROCEDURE EVALUATION
03/17/2023  Jordy Jeffers is a 77 y.o., male.      Pre-op Assessment    I have reviewed the Patient Summary Reports.     I have reviewed the Nursing Notes. I have reviewed the NPO Status.   I have reviewed the Medications.     Review of Systems  Anesthesia Hx:  No problems with previous Anesthesia    Social:  Former Smoker    Cardiovascular:   Hypertension CAD  CABG/stent     Pulmonary:   COPD, severe Sleep Apnea 4L home O2   Hepatic/GI:   GERD    Musculoskeletal:  Musculoskeletal Normal    Neurological:  Neurology Normal    Endocrine:   Diabetes, type 2  Morbid Obesity / BMI > 40  Dermatological:  Skin Normal    Psych:  Psychiatric Normal           Physical Exam  General: Cooperative, Alert and Oriented    Airway:  Mallampati: II   Mouth Opening: Normal  TM Distance: Normal  Neck ROM: Normal ROM    Dental:  Dentures        Anesthesia Plan  Type of Anesthesia, risks & benefits discussed:    Anesthesia Type: Gen Natural Airway  Intra-op Monitoring Plan: Standard ASA Monitors  Induction:  IV  Informed Consent: Informed consent signed with the Patient and all parties understand the risks and agree with anesthesia plan.  All questions answered.   ASA Score: 4  Day of Surgery Review of History & Physical: H&P Update referred to the surgeon/provider.    Ready For Surgery From Anesthesia Perspective.     .       Patient expected from [facility] with concern for UTI like symptoms. + Odor, pain and dysuria per patietn report, +chills, no fever noted. Blood Glucose 163.

## 2024-04-01 ENCOUNTER — OFFICE VISIT (OUTPATIENT)
Dept: PULMONOLOGY | Facility: CLINIC | Age: 79
End: 2024-04-01
Payer: OTHER GOVERNMENT

## 2024-04-01 VITALS
SYSTOLIC BLOOD PRESSURE: 140 MMHG | DIASTOLIC BLOOD PRESSURE: 70 MMHG | HEART RATE: 78 BPM | HEIGHT: 74 IN | OXYGEN SATURATION: 87 % | BODY MASS INDEX: 40.43 KG/M2 | WEIGHT: 315 LBS

## 2024-04-01 DIAGNOSIS — G47.33 OSA (OBSTRUCTIVE SLEEP APNEA): ICD-10-CM

## 2024-04-01 DIAGNOSIS — J44.9 CHRONIC OBSTRUCTIVE PULMONARY DISEASE, UNSPECIFIED COPD TYPE: Primary | ICD-10-CM

## 2024-04-01 DIAGNOSIS — R05.3 CHRONIC COUGH: ICD-10-CM

## 2024-04-01 DIAGNOSIS — J96.11 CHRONIC HYPOXEMIC RESPIRATORY FAILURE: ICD-10-CM

## 2024-04-01 DIAGNOSIS — E66.01 MORBID OBESITY WITH BODY MASS INDEX OF 40.0-44.9 IN ADULT: ICD-10-CM

## 2024-04-01 PROCEDURE — 99214 OFFICE O/P EST MOD 30 MIN: CPT | Mod: S$GLB,,, | Performed by: NURSE PRACTITIONER

## 2024-04-01 RX ORDER — TACROLIMUS 1 MG/G
OINTMENT TOPICAL
COMMUNITY
Start: 2023-11-01

## 2024-04-01 NOTE — PROGRESS NOTES
SUBJECTIVE:    Patient ID: Jordy Jeffers is a 78 y.o. male.    Chief Complaint: COPD and Medication Refill      Patient here today feeling alright.  He is wearing his oxygen all of the time and sleeping on his BIPAP with oxygen bled in.  He is using Wixela twice a day and duoneb 3 times a day still because he did not get the other nebulized medication I ordered last visit.The patient is not able to appropriately inhale the Wixela or Spiriva, he does not have enough inspiratory force.    He has not lost weight.  He is on pulsed dose initially in office with sat of 87%, placed on continuous and his sats go up to 93%.  Past Medical History:   Diagnosis Date    CAD (coronary artery disease)     Chronic hypoxemic respiratory failure     Colon polyps     COPD (chronic obstructive pulmonary disease)     Diastolic heart failure     Diverticulosis     DM (diabetes mellitus)     GERD (gastroesophageal reflux disease)     HTN (hypertension)     Hypercholesterolemia     Lung disease     Obesity     SYBIL (obstructive sleep apnea)      Past Surgical History:   Procedure Laterality Date    COLONOSCOPY  02/06/2019    Dr. Gonzalez, sent for scanning: diverticulosis, hemorrhoids, more than 6 polyps removed; biopsy: tubular adenomas and hyperplastic polyp; repeat in 3-5 years for surveillance    COLONOSCOPY N/A 3/17/2023    Procedure: COLONOSCOPY;  Surgeon: Rene Barrios MD;  Location: [facility];  Service: Endoscopy;  Laterality: N/A;    coronary artery stent      ESOPHAGOGASTRODUODENOSCOPY N/A 3/17/2023    Procedure: EGD (ESOPHAGOGASTRODUODENOSCOPY) Double d/t O2 therapy per NP;  Surgeon: Rene Barrios MD;  Location: [facility];  Service: Endoscopy;  Laterality: N/A;    r cataract resected      UPPER GASTROINTESTINAL ENDOSCOPY  02/06/2019    Dr. Gonzalez, sent for scanning: candida esophagitis; hiatal hernia, gastgritis, duodenitis; biopsy: GEJ- reflux, negative for ramires's; stomach chronic inactive inflammation    angelica  tumor removed        Family History   Problem Relation Age of Onset    Heart failure Mother     Coronary artery disease Father     Colon cancer Neg Hx     Esophageal cancer Neg Hx     Stomach cancer Neg Hx         Social History:   Marital Status:   Occupation: retired [employer]  Alcohol History:  reports that he does not currently use alcohol.  Tobacco History:  reports that he quit smoking about 21 years ago. His smoking use included cigarettes. He quit smokeless tobacco use about 20 years ago.  Drug History:  reports no history of drug use.    Review of patient's allergies indicates:   Allergen Reactions    Metformin        Current Outpatient Medications   Medication Sig Dispense Refill    albuterol (PROVENTIL/VENTOLIN HFA) 90 mcg/actuation inhaler INHALE 2 PUFFS BY MOUTH EVERY 4-6 HOURS AS NEEDED AS A RESCUE INHALER      albuterol-ipratropium (DUO-NEB) 2.5 mg-0.5 mg/3 mL nebulizer solution Take 3 mLs by nebulization 3 (three) times daily.      amLODIPine (NORVASC) 10 MG tablet Take 10 mg by mouth once daily.      ammonium lactate (LAC-HYDRIN) 12 % lotion by intratympanic route.      arformoteroL (BROVANA) 15 mcg/2 mL Nebu Take 2 mLs (15 mcg total) by nebulization 2 (two) times a day. Controller 60 each 6    atorvastatin (LIPITOR) 80 MG tablet Take 80 mg by mouth once daily.      benzonatate (TESSALON) 200 MG capsule TAKE ONE CAPSULE BY MOUTH THREE TIMES A DAY FOR COUGH      budesonide (PULMICORT) 0.5 mg/2 mL nebulizer solution Take 2 mLs (0.5 mg total) by nebulization 2 (two) times daily. Controller 120 mL 5    bumetanide (BUMEX) 1 MG tablet Take 3 mg by mouth 2 (two) times daily.       clopidogrel (PLAVIX) 75 mg tablet Take 75 mg by mouth once daily.      empagliflozin (JARDIANCE) 25 mg tablet Take 1 tablet by mouth every morning.      fluticasone (FLONASE) 50 mcg/actuation nasal spray 1 spray by Each Nare route once daily.      insulin aspart U-100 (NOVOLOG) 100 unit/mL Crtg Inject 10 Units into the skin.    "   losartan (COZAAR) 100 MG tablet Take 100 mg by mouth once daily.      losartan (COZAAR) 50 MG tablet Take 50 mg by mouth once daily.      metoprolol succinate (TOPROL-XL) 50 MG 24 hr tablet Take 50 mg by mouth once daily.       NIFEdipine (ADALAT CC) 60 MG TbSR Take 30 mg by mouth once daily.      nitroGLYCERIN (NITROSTAT) 0.4 MG SL tablet DISSOLVE ONE TABLET UNDER TONGUE EVERY 5 MINUTES FOR 3 DOSES-IF NO RELIEF, GO TO ER AS NEEDED FOR CHEST PAIN      pantoprazole (PROTONIX) 40 MG tablet Take 40 mg by mouth once daily.      potassium chloride (KLOR-CON) 10 MEQ TbSR Take 10 mEq by mouth 2 (two) times daily.       spironolactone (ALDACTONE) 25 MG tablet Take 25 mg by mouth.      tacrolimus (PROTOPIC) 0.1 % ointment APPLY LIBERAL AMOUNT TOPICALLY TWICE A DAY TO ITCHY RASH ON ABDOMEN AND GROIN      terazosin (HYTRIN) 2 MG capsule Take 2 mg by mouth every evening.      tiotropium bromide (SPIRIVA RESPIMAT) 2.5 mcg/actuation Mist Inhale 2 puffs into the lungs once daily. Controller 3 g 6    triamcinolone acetonide 0.1% (KENALOG) 0.1 % cream APPLY SMALL AMOUNT TOPICALLY TWICE A DAY AS NEEDED       No current facility-administered medications for this visit.       Last PFT:  05/2023  PFT shows severe  obstruction with good response to bronchodilator in small airway, mild restriction, severe diffusion defect        Review of Systems  General: feeling well  Eyes: Vision is good.  ENT:  No sinusitis or pharyngitis.   Heart:: No chest pain or palpitations.  Lungs: breathing is stable, still does cough throughout the day  GI: GERD  : No dysuria, hesitancy, or nocturia.  Musculoskeletal:  chronic back pain   Skin: No lesions or rashes.  Neuro: No headaches or neuropathy.  Lymph: swelling to legs  Psych: No anxiety or depression.  Endo: weight is stable     OBJECTIVE:      BP (!) 140/70 (BP Location: Left arm, Patient Position: Sitting, BP Method: Large (Manual))   Pulse 78   Ht 6' 2" (1.88 m)   Wt (!) 164.2 kg (362 lb)   " SpO2 (!) 87% Comment: 3 LPM PD/ pt 93% on 4 LPM cont  BMI 46.48 kg/m²     Physical Exam  GENERAL: Older patient in no distress.  HEENT: Pupils equal and reactive. Extraocular movements intact. Nose intact.  Pharynx moist.  NECK: Supple.   HEART: Regular rate and rhythm. No murmur or gallop auscultated.  LUNGS: decreased throughout  ABDOMEN:morbidly obese,  Bowel sounds present. Non-tender, no masses palpated.  EXTREMITIES: Normal muscle tone and joint movement, no cyanosis or clubbing. Wheelchair.  LYMPHATICS: 2+ pitting edema to legs   SKIN: Dry, intact, no lesions.   NEURO: Cranial nerves II-XII intact. Motor strength 5/5 bilaterally, upper and lower extremities.  PSYCH: Appropriate affect.        Assessment:       1. Chronic obstructive pulmonary disease, unspecified COPD type    2. Chronic hypoxemic respiratory failure    3. SYBIL (obstructive sleep apnea)    4. Morbid obesity with body mass index of 40.0-44.9 in adult    5. Chronic cough                  Plan:       Chronic obstructive pulmonary disease, unspecified COPD type    Chronic hypoxemic respiratory failure    SYBIL (obstructive sleep apnea)    Morbid obesity with body mass index of 40.0-44.9 in adult    Chronic cough             continue Wixela for now and the Duoneb at least 3 times a day for now  [facility] not [facility] brovana and Budesonide (patient not effectively able to inhale wixela and his PFT has worsened)    Oxygen all the time  Bipap with sleep, bleed oxygen in   Keep head of bed elevated, no eating or drinking at least an 2 hours before bed  Should be on continuous flow oxygen     Need to lose weight.   Follow up in about 6 months (around 10/1/2024).

## 2024-04-05 ENCOUNTER — TELEPHONE (OUTPATIENT)
Dept: PULMONOLOGY | Facility: CLINIC | Age: 79
End: 2024-04-05

## 2024-04-05 RX ORDER — ARFORMOTEROL TARTRATE 15 UG/2ML
15 SOLUTION RESPIRATORY (INHALATION) 2 TIMES DAILY
Qty: 60 EACH | Refills: 6 | Status: SHIPPED | OUTPATIENT
Start: 2024-04-05 | End: 2025-04-05

## 2024-04-05 RX ORDER — BUDESONIDE 0.5 MG/2ML
0.5 INHALANT ORAL 2 TIMES DAILY
Qty: 120 ML | Refills: 5 | Status: SHIPPED | OUTPATIENT
Start: 2024-04-05 | End: 2025-04-05

## 2024-04-05 NOTE — TELEPHONE ENCOUNTER
I spoke with Romero Delong with [facility] pharmacy.  Orders for placed again for Budesonide and Brovana. Notes need to be sent as well.  Fax to [phone]

## 2024-04-05 NOTE — TELEPHONE ENCOUNTER
I spoke with Romero Delong with [facility] pharmacy.  Orders for placed again for Budesonide and Brovana. Notes need to be sent as well.  Fax to [phone]    Faxed RX to Romero Delong @ [facility].

## 2024-05-14 ENCOUNTER — TELEPHONE (OUTPATIENT)
Dept: PULMONOLOGY | Facility: CLINIC | Age: 79
End: 2024-05-14

## 2024-05-14 RX ORDER — IPRATROPIUM BROMIDE AND ALBUTEROL SULFATE 2.5; .5 MG/3ML; MG/3ML
3 SOLUTION RESPIRATORY (INHALATION) EVERY 6 HOURS PRN
Qty: 120 EACH | Refills: 6 | Status: SHIPPED | OUTPATIENT
Start: 2024-05-14 | End: 2025-05-14

## 2024-09-26 ENCOUNTER — TELEPHONE (OUTPATIENT)
Dept: PULMONOLOGY | Facility: CLINIC | Age: 79
End: 2024-09-26
Payer: OTHER GOVERNMENT

## 2024-09-26 NOTE — TELEPHONE ENCOUNTER
----- Message from Mayelin Chavarria sent at 2024  3:26 PM CDT -----  Regarding: [facility] for services [facility]  Pt spouse called regarding his appt 10/2 with myriam wanting to make sure the visit is covered through the [facility]. The referral has  and there needs to be a request of services put in.   Callback# [phone]

## 2024-10-17 ENCOUNTER — OFFICE VISIT (OUTPATIENT)
Dept: PULMONOLOGY | Facility: CLINIC | Age: 79
End: 2024-10-17
Payer: OTHER GOVERNMENT

## 2024-10-17 VITALS
BODY MASS INDEX: 39.17 KG/M2 | OXYGEN SATURATION: 92 % | HEART RATE: 76 BPM | SYSTOLIC BLOOD PRESSURE: 140 MMHG | DIASTOLIC BLOOD PRESSURE: 76 MMHG | WEIGHT: 315 LBS | TEMPERATURE: 98 F | HEIGHT: 75 IN

## 2024-10-17 DIAGNOSIS — J96.11 CHRONIC HYPOXEMIC RESPIRATORY FAILURE: ICD-10-CM

## 2024-10-17 DIAGNOSIS — J44.9 CHRONIC OBSTRUCTIVE PULMONARY DISEASE, UNSPECIFIED COPD TYPE: Primary | ICD-10-CM

## 2024-10-17 DIAGNOSIS — G47.33 OSA (OBSTRUCTIVE SLEEP APNEA): ICD-10-CM

## 2024-10-17 DIAGNOSIS — E66.01 MORBID OBESITY WITH BODY MASS INDEX OF 40.0-44.9 IN ADULT: ICD-10-CM

## 2024-10-17 PROCEDURE — 99999 PR PBB SHADOW E&M-EST. PATIENT-LVL V: CPT | Mod: PBBFAC,,, | Performed by: NURSE PRACTITIONER

## 2024-10-17 PROCEDURE — 99215 OFFICE O/P EST HI 40 MIN: CPT | Mod: PBBFAC,PN | Performed by: NURSE PRACTITIONER

## 2024-10-17 RX ORDER — BENZONATATE 200 MG/1
200 CAPSULE ORAL 3 TIMES DAILY PRN
Qty: 90 CAPSULE | Refills: 6 | Status: SHIPPED | OUTPATIENT
Start: 2024-10-17 | End: 2024-10-27

## 2024-10-17 RX ORDER — FLUTICASONE PROPIONATE AND SALMETEROL 250; 50 UG/1; UG/1
1 POWDER RESPIRATORY (INHALATION) 2 TIMES DAILY
Qty: 60 EACH | Refills: 6 | Status: SHIPPED | OUTPATIENT
Start: 2024-10-17 | End: 2025-10-17

## 2024-10-17 RX ORDER — IPRATROPIUM BROMIDE AND ALBUTEROL SULFATE 2.5; .5 MG/3ML; MG/3ML
3 SOLUTION RESPIRATORY (INHALATION) EVERY 4 HOURS PRN
Qty: 180 EACH | Refills: 6 | Status: SHIPPED | OUTPATIENT
Start: 2024-10-17

## 2024-10-17 NOTE — PROGRESS NOTES
SUBJECTIVE:    Patient ID: Jordy Jeffers is a 79 y.o. male.    Chief Complaint: Follow-up (6 month follow up COPD)      Patient here today feeling alright.  He is wearing his oxygen all of the time and sleeping on his BIPAP with oxygen bled in.  He is using Wixela twice a day and duoneb 3 times a day still because he did not get the other nebulized medication I ordered last visit. [facility] not [facility] even after I spoke with them.  The patient is not able to appropriately inhale the Wixela, he does not have enough inspiratory force.    He has not lost weight.  He is short of breath with any exertion.  He is following with heart doctor in December.     Past Medical History:   Diagnosis Date    CAD (coronary artery disease)     Chronic hypoxemic respiratory failure     Colon polyps     COPD (chronic obstructive pulmonary disease)     Diastolic heart failure     Diverticulosis     DM (diabetes mellitus)     GERD (gastroesophageal reflux disease)     HTN (hypertension)     Hypercholesterolemia     Lung disease     Obesity     SYBIL (obstructive sleep apnea)      Past Surgical History:   Procedure Laterality Date    COLONOSCOPY  02/06/2019    Dr. Gonzalez, sent for scanning: diverticulosis, hemorrhoids, more than 6 polyps removed; biopsy: tubular adenomas and hyperplastic polyp; repeat in 3-5 years for surveillance    COLONOSCOPY N/A 3/17/2023    Procedure: COLONOSCOPY;  Surgeon: Rene Barrios MD;  Location: [facility];  Service: Endoscopy;  Laterality: N/A;    coronary artery stent      ESOPHAGOGASTRODUODENOSCOPY N/A 3/17/2023    Procedure: EGD (ESOPHAGOGASTRODUODENOSCOPY) Double d/t O2 therapy per NP;  Surgeon: Rene Barrios MD;  Location: [facility];  Service: Endoscopy;  Laterality: N/A;    r cataract resected      UPPER GASTROINTESTINAL ENDOSCOPY  02/06/2019    Dr. Gonzalez, sent for scanning: candida esophagitis; hiatal hernia, gastgritis, duodenitis; biopsy: GEJ- reflux, negative for  ramires's; stomach chronic inactive inflammation    warfin tumor removed        Family History   Problem Relation Name Age of Onset    Heart failure Mother      Coronary artery disease Father      Colon cancer Neg Hx      Esophageal cancer Neg Hx      Stomach cancer Neg Hx          Social History:   Marital Status:   Occupation: retired [employer]  Alcohol History:  reports that he does not currently use alcohol.  Tobacco History:  reports that he quit smoking about 21 years ago. His smoking use included cigarettes. He quit smokeless tobacco use about 20 years ago.  Drug History:  reports no history of drug use.    Review of patient's allergies indicates:   Allergen Reactions    Metformin        Current Outpatient Medications   Medication Sig Dispense Refill    albuterol (PROVENTIL/VENTOLIN HFA) 90 mcg/actuation inhaler INHALE 2 PUFFS BY MOUTH EVERY 4-6 HOURS AS NEEDED AS A RESCUE INHALER      albuterol-ipratropium (DUO-NEB) 2.5 mg-0.5 mg/3 mL nebulizer solution Take 3 mLs by nebulization 3 (three) times daily.      albuterol-ipratropium (DUO-NEB) 2.5 mg-0.5 mg/3 mL nebulizer solution Take 3 mLs by nebulization every 6 (six) hours as needed for Wheezing or Shortness of Breath. Rescue 120 each 6    amLODIPine (NORVASC) 10 MG tablet Take 10 mg by mouth once daily.      ammonium lactate (LAC-HYDRIN) 12 % lotion by intratympanic route.      atorvastatin (LIPITOR) 80 MG tablet Take 80 mg by mouth once daily.      benzonatate (TESSALON) 200 MG capsule TAKE ONE CAPSULE BY MOUTH THREE TIMES A DAY FOR COUGH      bumetanide (BUMEX) 1 MG tablet Take 3 mg by mouth 2 (two) times daily.       clopidogrel (PLAVIX) 75 mg tablet Take 75 mg by mouth once daily.      empagliflozin (JARDIANCE) 25 mg tablet Take 1 tablet by mouth every morning.      fluticasone (FLONASE) 50 mcg/actuation nasal spray 1 spray by Each Nare route once daily.      insulin aspart U-100 (NOVOLOG) 100 unit/mL Crtg Inject 10 Units into the skin.      losartan  (COZAAR) 100 MG tablet Take 100 mg by mouth once daily.      metoprolol succinate (TOPROL-XL) 50 MG 24 hr tablet Take 50 mg by mouth once daily.       pantoprazole (PROTONIX) 40 MG tablet Take 40 mg by mouth once daily.      potassium chloride (KLOR-CON) 10 MEQ TbSR Take 10 mEq by mouth 2 (two) times daily.       spironolactone (ALDACTONE) 25 MG tablet Take 25 mg by mouth.      tacrolimus (PROTOPIC) 0.1 % ointment APPLY LIBERAL AMOUNT TOPICALLY TWICE A DAY TO ITCHY RASH ON ABDOMEN AND GROIN      terazosin (HYTRIN) 2 MG capsule Take 2 mg by mouth every evening.      triamcinolone acetonide 0.1% (KENALOG) 0.1 % cream APPLY SMALL AMOUNT TOPICALLY TWICE A DAY AS NEEDED      albuterol-ipratropium (DUO-NEB) 2.5 mg-0.5 mg/3 mL nebulizer solution Take 3 mLs by nebulization every 4 (four) hours as needed for Shortness of Breath. Rescue 180 each 6    benzonatate (TESSALON) 200 MG capsule Take 1 capsule (200 mg total) by mouth 3 (three) times daily as needed for Cough. 90 capsule 6    fluticasone-salmeterol diskus inhaler 250-50 mcg Inhale 1 puff into the lungs 2 (two) times daily. Controller 60 each 6    NIFEdipine (ADALAT CC) 60 MG TbSR Take 30 mg by mouth once daily. (Patient not taking: Reported on 10/17/2024)      nitroGLYCERIN (NITROSTAT) 0.4 MG SL tablet DISSOLVE ONE TABLET UNDER TONGUE EVERY 5 MINUTES FOR 3 DOSES-IF NO RELIEF, GO TO ER AS NEEDED FOR CHEST PAIN       No current facility-administered medications for this visit.       Last PFT:  05/2023  PFT shows severe  obstruction with good response to bronchodilator in small airway, mild restriction, severe diffusion defect        Review of Systems  General: feeling well  Eyes: Vision is good.  ENT:  No sinusitis or pharyngitis.   Heart:: No chest pain or palpitations.  Lungs: worsening dyspnea   GI: GERD  : No dysuria, hesitancy, or nocturia.  Musculoskeletal:  chronic back pain   Skin: No lesions or rashes.  Neuro: No headaches or neuropathy.  Lymph: swelling to  "legs  Psych: No anxiety or depression.  Endo: weight is stable     OBJECTIVE:      BP (!) 140/76 (BP Location: Right arm)   Pulse 76   Temp 98.4 °F (36.9 °C)   Ht 6' 3" (1.905 m)   Wt (!) 164.2 kg (362 lb)   SpO2 (!) 92% Comment: On 4 liters of Oxygen  BMI 45.25 kg/m²     Physical Exam  GENERAL: Older patient in no distress.  HEENT: Pupils equal and reactive. Extraocular movements intact. Nose intact.  Pharynx moist.  NECK: Supple.   HEART: Regular rate and rhythm. No murmur or gallop auscultated.  LUNGS: decreased throughout  ABDOMEN:morbidly obese,  Bowel sounds present. Non-tender, no masses palpated.  EXTREMITIES: Normal muscle tone and joint movement, no cyanosis or clubbing. Wheelchair.  LYMPHATICS: 2+ pitting edema to legs   SKIN: Dry, intact, no lesions.   NEURO: Cranial nerves II-XII intact. Motor strength 5/5 bilaterally, upper and lower extremities.  PSYCH: Appropriate affect.        Assessment:       1. Chronic obstructive pulmonary disease, unspecified COPD type    2. Chronic hypoxemic respiratory failure    3. SYBIL (obstructive sleep apnea)    4. Morbid obesity with body mass index of 40.0-44.9 in adult                    Plan:       Chronic obstructive pulmonary disease, unspecified COPD type    Chronic hypoxemic respiratory failure    SYBIL (obstructive sleep apnea)    Morbid obesity with body mass index of 40.0-44.9 in adult    Other orders  -     albuterol-ipratropium (DUO-NEB) 2.5 mg-0.5 mg/3 mL nebulizer solution; Take 3 mLs by nebulization every 4 (four) hours as needed for Shortness of Breath. Rescue  Dispense: 180 each; Refill: 6  -     benzonatate (TESSALON) 200 MG capsule; Take 1 capsule (200 mg total) by mouth 3 (three) times daily as needed for Cough.  Dispense: 90 capsule; Refill: 6  -     fluticasone-salmeterol diskus inhaler 250-50 mcg; Inhale 1 puff into the lungs 2 (two) times daily. Controller  Dispense: 60 each; Refill: 6               Will change the Duoneb to every 4 hours as " needed, use 20 minutes before the Wixela to see if can get in more     Oxygen all the time  Bipap with sleep, bleed oxygen in   Keep head of bed elevated, no eating or drinking at least an 2 hours before bed  Should be on continuous flow oxygen     Need to lose weight.  Need to check in with cardiologist    Follow up in about 6 months (around 4/17/2025).

## 2024-10-17 NOTE — PATIENT INSTRUCTIONS
Will change the Duoneb to every 4 hours as needed, use 20 minutes before the Wixela to see if can get in more     Oxygen all the time  Bipap with sleep, bleed oxygen in   Keep head of bed elevated, no eating or drinking at least an 2 hours before bed  Should be on continuous flow oxygen     Need to lose weight.  Need to check in with cardiologist    Follow up in about 6 months (around 4/17/2025).

## 2025-05-08 DIAGNOSIS — J44.9 CHRONIC OBSTRUCTIVE PULMONARY DISEASE, UNSPECIFIED: Primary | ICD-10-CM

## 2025-06-16 ENCOUNTER — TELEPHONE (OUTPATIENT)
Dept: PULMONOLOGY | Facility: CLINIC | Age: 80
End: 2025-06-16
Payer: OTHER GOVERNMENT

## 2025-06-16 NOTE — TELEPHONE ENCOUNTER
Spoke to wife.  Informed her that we do have wheelchairs at the office.  Verbalized understanding.

## 2025-06-16 NOTE — TELEPHONE ENCOUNTER
----- Message from Mary sent at 6/16/2025  1:54 PM CDT -----  Regarding: Wheelchair?  Hello,    Pt's wife called, she states pt has an appt scheduled tomorrow to see Dr. Swartz, she wanted to know if there was a wheelchair there for the pt to use when they get there? Please advise    Thank you!     Call back number:   Cherri Jeffers (Wife):  [phone]

## 2025-06-17 ENCOUNTER — OFFICE VISIT (OUTPATIENT)
Dept: PULMONOLOGY | Facility: CLINIC | Age: 80
End: 2025-06-17
Payer: OTHER GOVERNMENT

## 2025-06-17 VITALS
HEART RATE: 82 BPM | WEIGHT: 315 LBS | SYSTOLIC BLOOD PRESSURE: 118 MMHG | HEIGHT: 75 IN | OXYGEN SATURATION: 89 % | BODY MASS INDEX: 39.17 KG/M2 | DIASTOLIC BLOOD PRESSURE: 67 MMHG

## 2025-06-17 DIAGNOSIS — G47.33 OBSTRUCTIVE SLEEP APNEA: ICD-10-CM

## 2025-06-17 DIAGNOSIS — J41.1 BRONCHITIS, CHRONIC, MUCOPURULENT: ICD-10-CM

## 2025-06-17 DIAGNOSIS — J44.89 ASTHMA-COPD OVERLAP SYNDROME: ICD-10-CM

## 2025-06-17 DIAGNOSIS — R09.89 CHRONIC SINUS COMPLAINTS: ICD-10-CM

## 2025-06-17 DIAGNOSIS — E11.9 INSULIN DEPENDENT TYPE 2 DIABETES MELLITUS: ICD-10-CM

## 2025-06-17 DIAGNOSIS — J44.9 CHRONIC OBSTRUCTIVE PULMONARY DISEASE, UNSPECIFIED COPD TYPE: Primary | ICD-10-CM

## 2025-06-17 DIAGNOSIS — Z77.090 ASBESTOS EXPOSURE: ICD-10-CM

## 2025-06-17 DIAGNOSIS — J96.11 CHRONIC HYPOXEMIC RESPIRATORY FAILURE: ICD-10-CM

## 2025-06-17 DIAGNOSIS — J45.50 SEVERE PERSISTENT ASTHMA WITHOUT COMPLICATION: ICD-10-CM

## 2025-06-17 DIAGNOSIS — Z79.4 INSULIN DEPENDENT TYPE 2 DIABETES MELLITUS: ICD-10-CM

## 2025-06-17 DIAGNOSIS — E66.01 MORBID OBESITY: ICD-10-CM

## 2025-06-17 DIAGNOSIS — J44.9 CHRONIC OBSTRUCTIVE PULMONARY DISEASE, UNSPECIFIED: ICD-10-CM

## 2025-06-17 PROCEDURE — 99999 PR PBB SHADOW E&M-EST. PATIENT-LVL V: CPT | Mod: PBBFAC,,, | Performed by: INTERNAL MEDICINE

## 2025-06-17 PROCEDURE — 99215 OFFICE O/P EST HI 40 MIN: CPT | Mod: PBBFAC,PO | Performed by: INTERNAL MEDICINE

## 2025-06-17 PROCEDURE — 99215 OFFICE O/P EST HI 40 MIN: CPT | Mod: S$PBB,,, | Performed by: INTERNAL MEDICINE

## 2025-06-17 RX ORDER — KETOCONAZOLE 20 MG/G
CREAM TOPICAL
COMMUNITY
Start: 2025-05-19

## 2025-06-17 RX ORDER — BUMETANIDE 2 MG/1
4 TABLET ORAL
COMMUNITY
Start: 2025-01-17

## 2025-06-17 RX ORDER — FLUTICASONE PROPIONATE 50 MCG
2 SPRAY, SUSPENSION (ML) NASAL DAILY
Qty: 48 G | Refills: 3 | Status: SHIPPED | OUTPATIENT
Start: 2025-06-17

## 2025-06-17 RX ORDER — SPIRONOLACTONE 25 MG/1
50 TABLET ORAL
COMMUNITY
Start: 2025-03-10

## 2025-06-17 RX ORDER — DESOXIMETASONE 2.5 MG/G
CREAM TOPICAL
COMMUNITY
Start: 2024-10-30

## 2025-06-17 RX ORDER — PREDNISONE 20 MG/1
TABLET ORAL
Qty: 21 TABLET | Refills: 2 | Status: SHIPPED | OUTPATIENT
Start: 2025-06-17

## 2025-06-17 RX ORDER — DOXYCYCLINE 100 MG/1
100 CAPSULE ORAL EVERY 12 HOURS
Qty: 20 CAPSULE | Refills: 2 | Status: SHIPPED | OUTPATIENT
Start: 2025-06-17

## 2025-06-17 RX ORDER — ALBUTEROL SULFATE 90 UG/1
2 INHALANT RESPIRATORY (INHALATION) EVERY 4 HOURS PRN
Qty: 54 G | Refills: 3 | Status: SHIPPED | OUTPATIENT
Start: 2025-06-17

## 2025-06-17 RX ORDER — FLUTICASONE FUROATE, UMECLIDINIUM BROMIDE AND VILANTEROL TRIFENATATE 200; 62.5; 25 UG/1; UG/1; UG/1
1 POWDER RESPIRATORY (INHALATION) DAILY
Qty: 180 EACH | Refills: 3 | Status: SHIPPED | OUTPATIENT
Start: 2025-06-17

## 2025-06-17 RX ORDER — BENZONATATE 200 MG/1
200 CAPSULE ORAL 3 TIMES DAILY PRN
Qty: 270 CAPSULE | Refills: 3 | Status: SHIPPED | OUTPATIENT
Start: 2025-06-17

## 2025-06-17 RX ORDER — HUMAN INSULIN 100 [IU]/ML
INJECTION, SUSPENSION SUBCUTANEOUS
COMMUNITY
Start: 2024-09-25

## 2025-06-17 RX ORDER — IPRATROPIUM BROMIDE AND ALBUTEROL SULFATE 2.5; .5 MG/3ML; MG/3ML
3 SOLUTION RESPIRATORY (INHALATION) EVERY 4 HOURS PRN
Qty: 360 ML | Refills: 3 | Status: SHIPPED | OUTPATIENT
Start: 2025-06-17

## 2025-06-17 RX ORDER — KETOCONAZOLE 20 MG/ML
SHAMPOO, SUSPENSION TOPICAL
COMMUNITY
Start: 2025-05-19

## 2025-06-17 NOTE — PROGRESS NOTES
6/17/2025    Jordy Jeffers  New Patient Consult    Chief Complaint   Patient presents with    Follow-up    COPD       HPI:       6/17/2025 pt had been seen by HUA Denson in past.    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  Uses ox 24/7 -- housebound activity.  Walks 20 ft at most last.  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  Had blue uniform covered in white after day wk at that time in 4124-2411..  Pt stopped smokes after Meg-- 20  yrs ago.  Lung problems started 5 yrs ago    Pt cough productive yellow mucous.  No abx-- did take doxy 50 bid for legs in past    Pt wheezes, not clearly worse at night, no seasonal variability.    Has leg edema with h/o diastolic heart failure. Uses chronic bumex bid with metalozone prn-- breathing not significantly better with fluid control.      Sleep study 4/26/1996 had ahi 70.5  Cxr prom vasc, no echo for pulm art pressure   2018 and 2019 pft with good bd response and dlco was 26% 2029.    PFSH:  Past Medical History:   Diagnosis Date    CAD (coronary artery disease)     Chronic hypoxemic respiratory failure     Colon polyps     COPD (chronic obstructive pulmonary disease)     Diastolic heart failure     Diverticulosis     DM (diabetes mellitus)     GERD (gastroesophageal reflux disease)     HTN (hypertension)     Hypercholesterolemia     Lung disease     Obesity     SYBIL (obstructive sleep apnea)          Past Surgical History:   Procedure Laterality Date    COLONOSCOPY  02/06/2019    Dr. Gonzalez, sent for scanning: diverticulosis, hemorrhoids, more than 6 polyps removed; biopsy: tubular adenomas and hyperplastic polyp; repeat in 3-5 years for surveillance    COLONOSCOPY N/A 3/17/2023    Procedure: COLONOSCOPY;  Surgeon: Rene Barrios MD;  Location: [facility];  Service: Endoscopy;  Laterality: N/A;    coronary artery stent      ESOPHAGOGASTRODUODENOSCOPY N/A 3/17/2023    Procedure: EGD (ESOPHAGOGASTRODUODENOSCOPY) Double  "d/t O2 therapy per NP;  Surgeon: Rene Barrios MD;  Location: [facility];  Service: Endoscopy;  Laterality: N/A;    r cataract resected      UPPER GASTROINTESTINAL ENDOSCOPY  02/06/2019    Dr. Gonzalez, sent for scanning: candida esophagitis; hiatal hernia, gastgritis, duodenitis; biopsy: GEJ- reflux, negative for ramires's; stomach chronic inactive inflammation    warfin tumor removed        Social History[1]  Family History   Problem Relation Name Age of Onset    Heart failure Mother      Coronary artery disease Father      Colon cancer Neg Hx      Esophageal cancer Neg Hx      Stomach cancer Neg Hx       Review of patient's allergies indicates:   Allergen Reactions    Metformin Diarrhea          Review of Systems:  a review of eleven systems covering constitutional, Eye, HEENT, Psych, Respiratory, Cardiac, GI, , Musculoskeletal, Endocrine, Dermatologic was negative except for pertinent findings as listed ABOVE and below:  pertinent positives as above, rest good        Exam:Comprehensive exam done. /67 (BP Location: Left arm, Patient Position: Sitting)   Pulse 82   Ht 6' 3" (1.905 m)   Wt (!) 164.2 kg (361 lb 15.9 oz)   SpO2 (!) 89% Comment: on 4lpm continuous flow  BMI 45.25 kg/m²   Exam included Vitals as listed, and patient's appearance and affect and alertness and mood, oral exam for yeast and hygiene and pharynx lesions and Mallapatti (M) score, neck with inspection for jvd and masses and thyroid abnormalities and lymph nodes (supraclavicular and infraclavicular nodes and axillary also examined and noted if abn), chest exam included symmetry and effort and fremitus and percussion and auscultation, cardiac exam included rhythm and gallops and murmur and rubs and jvd and edema, abdominal exam for mass and hepatosplenomegaly and tenderness and hernias and bowel sounds, Musculoskeletal exam with muscle tone and posture and mobility/gait and  strength, and skin for rashes and cyanosis and pallor " "and turgor, extremity for clubbing.  Findings were normal except for pertinent findings listed below:  M4, morbid, chest is symmetric, no distress, normal percussion, normal fremitus and diffuse mild wheezes  +1-2 tibial edema           Radiographs (ct chest and cxr) reviewed: view by direct vision      Labs reviewed           PFT results reviewed   Pft 2019 fev1 46% wtijh dlco 26% 9% bd  Pft 2018 fev1 56% with dlco 46% and bd 13%  Plan:  Clinical impression is apparently straight forward and impression with management as below.    Jordy Ray" was seen today for follow-up and copd.    Diagnoses and all orders for this visit:    Chronic obstructive pulmonary disease, unspecified COPD type    Chronic obstructive pulmonary disease, unspecified  -     Ambulatory referral/consult to Pulmonology  -     fluticasone-umeclidin-vilanter (TRELEGY ELLIPTA) 200-62.5-25 mcg inhaler; Inhale 1 puff into the lungs once daily.  -     predniSONE (DELTASONE) 20 MG tablet; Take one daily for 3 days and may repeat for shortness of breath.  -     fluticasone propionate (FLONASE) 50 mcg/actuation nasal spray; 2 sprays (100 mcg total) by Each Nostril route once daily.    Chronic hypoxemic respiratory failure    Asbestos exposure    Asthma-COPD overlap syndrome  -     fluticasone-umeclidin-vilanter (TRELEGY ELLIPTA) 200-62.5-25 mcg inhaler; Inhale 1 puff into the lungs once daily.  -     predniSONE (DELTASONE) 20 MG tablet; Take one daily for 3 days and may repeat for shortness of breath.  -     CBC auto differential; Future  -     albuterol (PROVENTIL/VENTOLIN HFA) 90 mcg/actuation inhaler; Inhale 2 puffs into the lungs every 4 (four) hours as needed for Wheezing. Rescue  -     albuterol-ipratropium (DUO-NEB) 2.5 mg-0.5 mg/3 mL nebulizer solution; Take 3 mLs by nebulization every 4 (four) hours as needed for Wheezing.  -     CBC auto differential    Morbid obesity    Obstructive sleep apnea  -     CPAP/BIPAP SUPPLIES    Chronic sinus " complaints  -     fluticasone propionate (FLONASE) 50 mcg/actuation nasal spray; 2 sprays (100 mcg total) by Each Nostril route once daily.    Insulin dependent type 2 diabetes mellitus    Bronchitis, chronic, mucopurulent  -     doxycycline (VIBRAMYCIN) 100 MG Cap; Take 1 capsule (100 mg total) by mouth every 12 (twelve) hours.  -     Culture, Respiratory with Gram Stain; Future  -     X-Ray Chest PA And Lateral; Future  -     Culture, Respiratory with Gram Stain    Severe persistent asthma without complication  -     predniSONE (DELTASONE) 20 MG tablet; Take one daily for 3 days and may repeat for shortness of breath.  -     CBC auto differential; Future  -     albuterol (PROVENTIL/VENTOLIN HFA) 90 mcg/actuation inhaler; Inhale 2 puffs into the lungs every 4 (four) hours as needed for Wheezing. Rescue  -     Culture, Respiratory with Gram Stain; Future  -     X-Ray Chest PA And Lateral; Future  -     CBC auto differential  -     Culture, Respiratory with Gram Stain    Other orders  -     benzonatate (TESSALON) 200 MG capsule; Take 1 capsule (200 mg total) by mouth 3 (three) times daily as needed for Cough.        Follow up in about 4 weeks (around 7/15/2025).    Discussed with patient above for education the following:      Patient Instructions   Sleep apnea was severe in 1996 at 70.5. you need nasal mask.     Will order nasal cpap mask, continue bipap at home    Copd with asthma likely -- prior breathing test suggested asthma component    You should improve with prednisone 20 mg daily for 3days-- may repeat -- will give 21 pills -- could take weekly or every other if needed..    Use controller -- trelegy sampled at 200 size, use daily.  May substitute?    Use albuterol 2-4 puffs up to every 4 hrs.    May need special asthma/copd-- dupixent or nucala....    Start ozempic -- would continue as may help sleep apnea.. would increase dose in 4 wks if tolerate--may increase to 0.5 at follow up    Consider shots after on  controller for 3 months -- if needed.    A month of trelegy samples given today..            Eval took 43 min       [1]   Social History  Tobacco Use    Smoking status: Former     Current packs/day: 0.00     Types: Cigarettes     Quit date:      Years since quittin.4    Smokeless tobacco: Former     Quit date:    Substance Use Topics    Alcohol use: Not Currently    Drug use: No

## 2025-06-17 NOTE — PATIENT INSTRUCTIONS
Sleep apnea was severe in 1996 at 70.5. you need nasal mask.     Will order nasal cpap mask, continue bipap at home    Copd with asthma likely -- prior breathing test suggested asthma component    You should improve with prednisone 20 mg daily for 3days-- may repeat -- will give 21 pills -- could take weekly or every other if needed..    Use controller -- trelegy sampled at 200 size, use daily.  May substitute?    Use albuterol 2-4 puffs up to every 4 hrs.    May need special asthma/copd-- dupixent or nucala....    Start ozempic -- would continue as may help sleep apnea.. would increase dose in 4 wks if tolerate--may increase to 0.5 at follow up    Consider shots after on controller for 3 months -- if needed.    A month of trelegy samples given today..

## 2025-06-23 ENCOUNTER — HOSPITAL ENCOUNTER (OUTPATIENT)
Dept: RADIOLOGY | Facility: HOSPITAL | Age: 80
Discharge: HOME | End: 2025-06-23
Attending: INTERNAL MEDICINE
Payer: OTHER GOVERNMENT

## 2025-06-23 ENCOUNTER — RESULTS FOLLOW-UP (OUTPATIENT)
Dept: PULMONOLOGY | Facility: CLINIC | Age: 80
End: 2025-06-23

## 2025-06-23 DIAGNOSIS — J41.1 BRONCHITIS, CHRONIC, MUCOPURULENT: ICD-10-CM

## 2025-06-23 DIAGNOSIS — J45.50 SEVERE PERSISTENT ASTHMA WITHOUT COMPLICATION: ICD-10-CM

## 2025-06-23 PROCEDURE — 71046 X-RAY EXAM CHEST 2 VIEWS: CPT | Mod: 26,,, | Performed by: RADIOLOGY

## 2025-06-23 PROCEDURE — 71046 X-RAY EXAM CHEST 2 VIEWS: CPT | Mod: TC

## 2025-07-03 ENCOUNTER — TELEPHONE (OUTPATIENT)
Dept: PULMONOLOGY | Facility: CLINIC | Age: 80
End: 2025-07-03
Payer: OTHER GOVERNMENT

## 2025-07-03 DIAGNOSIS — J44.9 CHRONIC OBSTRUCTIVE PULMONARY DISEASE, UNSPECIFIED COPD TYPE: Primary | ICD-10-CM

## 2025-07-03 RX ORDER — BUDESONIDE, GLYCOPYRROLATE, AND FORMOTEROL FUMARATE 160; 9; 4.8 UG/1; UG/1; UG/1
2 AEROSOL, METERED RESPIRATORY (INHALATION) 2 TIMES DAILY
Qty: 32.1 G | Refills: 3 | Status: SHIPPED | OUTPATIENT
Start: 2025-07-03

## 2025-07-03 NOTE — TELEPHONE ENCOUNTER
[facility] will not cover Trelegy.  It is non-formulary.  They will cover the following:    Combivent Respimat  Breztri  Advair Diskus  Stiolto Respimat  5.   DuoNeb

## 2025-07-17 ENCOUNTER — LAB VISIT (OUTPATIENT)
Dept: LAB | Facility: HOSPITAL | Age: 80
End: 2025-07-17
Attending: INTERNAL MEDICINE
Payer: OTHER GOVERNMENT

## 2025-07-17 ENCOUNTER — OFFICE VISIT (OUTPATIENT)
Dept: PULMONOLOGY | Facility: CLINIC | Age: 80
End: 2025-07-17
Payer: OTHER GOVERNMENT

## 2025-07-17 VITALS
HEIGHT: 75 IN | HEART RATE: 87 BPM | OXYGEN SATURATION: 87 % | SYSTOLIC BLOOD PRESSURE: 129 MMHG | BODY MASS INDEX: 45.25 KG/M2 | DIASTOLIC BLOOD PRESSURE: 69 MMHG

## 2025-07-17 DIAGNOSIS — E66.01 MORBID OBESITY WITH BODY MASS INDEX OF 40.0-44.9 IN ADULT: ICD-10-CM

## 2025-07-17 DIAGNOSIS — R06.00 DYSPNEA, UNSPECIFIED TYPE: ICD-10-CM

## 2025-07-17 DIAGNOSIS — M17.11 ARTHRITIS OF RIGHT KNEE: ICD-10-CM

## 2025-07-17 DIAGNOSIS — J41.1 BRONCHITIS, CHRONIC, MUCOPURULENT: ICD-10-CM

## 2025-07-17 DIAGNOSIS — J45.50 SEVERE PERSISTENT ASTHMA WITHOUT COMPLICATION: Primary | ICD-10-CM

## 2025-07-17 DIAGNOSIS — J44.9 CHRONIC OBSTRUCTIVE PULMONARY DISEASE, UNSPECIFIED COPD TYPE: ICD-10-CM

## 2025-07-17 DIAGNOSIS — G47.33 OSA (OBSTRUCTIVE SLEEP APNEA): ICD-10-CM

## 2025-07-17 DIAGNOSIS — Z91.81 AT HIGH RISK FOR FALLS: ICD-10-CM

## 2025-07-17 DIAGNOSIS — J44.89 ASTHMA-COPD OVERLAP SYNDROME: ICD-10-CM

## 2025-07-17 LAB
ABSOLUTE EOSINOPHIL (SMH): 0.51 K/UL
ABSOLUTE MONOCYTE (SMH): 1.03 K/UL (ref 0.3–1)
ABSOLUTE NEUTROPHIL COUNT (SMH): 8.1 K/UL (ref 1.8–7.7)
BASOPHILS # BLD AUTO: 0.09 K/UL
BASOPHILS NFR BLD AUTO: 0.8 %
ERYTHROCYTE [DISTWIDTH] IN BLOOD BY AUTOMATED COUNT: 15.9 % (ref 11.5–14.5)
HCT VFR BLD AUTO: 36.5 % (ref 40–54)
HGB BLD-MCNC: 11.5 GM/DL (ref 14–18)
IMM GRANULOCYTES # BLD AUTO: 0.07 K/UL (ref 0–0.04)
IMM GRANULOCYTES NFR BLD AUTO: 0.6 % (ref 0–0.5)
LYMPHOCYTES # BLD AUTO: 1.47 K/UL (ref 1–4.8)
MCH RBC QN AUTO: 29.8 PG (ref 27–31)
MCHC RBC AUTO-ENTMCNC: 31.5 G/DL (ref 32–36)
MCV RBC AUTO: 95 FL (ref 82–98)
NUCLEATED RBC (/100WBC) (SMH): 0 /100 WBC
PLATELET # BLD AUTO: 331 K/UL (ref 150–450)
PMV BLD AUTO: 10.2 FL (ref 9.2–12.9)
RBC # BLD AUTO: 3.86 M/UL (ref 4.6–6.2)
RELATIVE EOSINOPHIL (SMH): 4.5 % (ref 0–8)
RELATIVE LYMPHOCYTE (SMH): 13.1 % (ref 18–48)
RELATIVE MONOCYTE (SMH): 9.2 % (ref 4–15)
RELATIVE NEUTROPHIL (SMH): 71.8 % (ref 38–73)
WBC # BLD AUTO: 11.23 K/UL (ref 3.9–12.7)

## 2025-07-17 PROCEDURE — 99213 OFFICE O/P EST LOW 20 MIN: CPT | Mod: PBBFAC,PO | Performed by: INTERNAL MEDICINE

## 2025-07-17 PROCEDURE — 36415 COLL VENOUS BLD VENIPUNCTURE: CPT

## 2025-07-17 PROCEDURE — 85025 COMPLETE CBC W/AUTO DIFF WBC: CPT

## 2025-07-17 PROCEDURE — 99999 PR PBB SHADOW E&M-EST. PATIENT-LVL III: CPT | Mod: PBBFAC,,, | Performed by: INTERNAL MEDICINE

## 2025-07-17 RX ORDER — CIPROFLOXACIN 500 MG/1
500 TABLET, FILM COATED ORAL 2 TIMES DAILY
Qty: 20 TABLET | Refills: 2 | Status: SHIPPED | OUTPATIENT
Start: 2025-07-17

## 2025-07-17 NOTE — PROGRESS NOTES
7/17/2025    Jordy Jeffers  New Patient Consult    Chief Complaint   Patient presents with    COPD       HPI:     7/17/2025  pt did  better after prednisone and trelegy, and course doxy.  Mucous yellow still despite doxy-- 1/4 cup mucous daily.    Can barely walk with fabian and severe right knee problems...   cpap going well....    6/17/2025 pt had been seen by HUA Denson in past.    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  Uses ox 24/7 -- housebound activity.  Walks 20 ft at most last.  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  Had blue uniform covered in white after day wk at that time in 1706-7716..  Pt stopped smokes after Meg-- 20  yrs ago.  Lung problems started 5 yrs ago    Pt cough productive yellow mucous.  No abx-- did take doxy 50 bid for legs in past    Pt wheezes, not clearly worse at night, no seasonal variability.    Has leg edema with h/o diastolic heart failure. Uses chronic bumex bid with metalozone prn-- breathing not significantly better with fluid control.    Patient Instructions   Sleep apnea was severe in 1996 at 70.5. you need nasal mask.     Will order nasal cpap mask, continue bipap at home    Copd with asthma likely -- prior breathing test suggested asthma component    You should improve with prednisone 20 mg daily for 3days-- may repeat -- will give 21 pills -- could take weekly or every other if needed..    Use controller -- trelegy sampled at 200 size, use daily.  May substitute?    Use albuterol 2-4 puffs up to every 4 hrs.    May need special asthma/copd-- dupixent or nucala....    Start ozempic -- would continue as may help sleep apnea.. would increase dose in 4 wks if tolerate--may increase to 0.5 at follow up    Consider shots after on controller for 3 months -- if needed.    A month of trelegy samples given today..  Sleep study 4/26/1996 had ahi 70.5  Cxr prom vasc, no echo for pulm art pressure   2018 and 2019 pft with good bd  response and dlco was 26% 2029.    PFSH:  Past Medical History:   Diagnosis Date    CAD (coronary artery disease)     Chronic hypoxemic respiratory failure     Colon polyps     COPD (chronic obstructive pulmonary disease)     Diastolic heart failure     Diverticulosis     DM (diabetes mellitus)     GERD (gastroesophageal reflux disease)     HTN (hypertension)     Hypercholesterolemia     Lung disease     Obesity     SYBIL (obstructive sleep apnea)          Past Surgical History:   Procedure Laterality Date    COLONOSCOPY  02/06/2019    Dr. Gonzalez, sent for scanning: diverticulosis, hemorrhoids, more than 6 polyps removed; biopsy: tubular adenomas and hyperplastic polyp; repeat in 3-5 years for surveillance    COLONOSCOPY N/A 3/17/2023    Procedure: COLONOSCOPY;  Surgeon: Rene Barrios MD;  Location: [facility];  Service: Endoscopy;  Laterality: N/A;    coronary artery stent      ESOPHAGOGASTRODUODENOSCOPY N/A 3/17/2023    Procedure: EGD (ESOPHAGOGASTRODUODENOSCOPY) Double d/t O2 therapy per NP;  Surgeon: Rene Barrios MD;  Location: [facility];  Service: Endoscopy;  Laterality: N/A;    r cataract resected      UPPER GASTROINTESTINAL ENDOSCOPY  02/06/2019    Dr. Gonzalez, sent for scanning: candida esophagitis; hiatal hernia, gastgritis, duodenitis; biopsy: GEJ- reflux, negative for ramires's; stomach chronic inactive inflammation    warfin tumor removed        Social History[1]  Family History   Problem Relation Name Age of Onset    Heart failure Mother      Coronary artery disease Father      Colon cancer Neg Hx      Esophageal cancer Neg Hx      Stomach cancer Neg Hx       Review of patient's allergies indicates:   Allergen Reactions    Metformin Diarrhea          Review of Systems:  a review of eleven systems covering constitutional, Eye, HEENT, Psych, Respiratory, Cardiac, GI, , Musculoskeletal, Endocrine, Dermatologic was negative except for pertinent findings as listed ABOVE and below:  pertinent  "positives as above, rest good        Exam:Comprehensive exam done. /69 (BP Location: Right arm, Patient Position: Sitting)   Pulse 87   Ht 6' 3" (1.905 m)   SpO2 (!) 87% Comment: on room air at rest  BMI 45.25 kg/m²   Exam included Vitals as listed, and patient's appearance and affect and alertness and mood, oral exam for yeast and hygiene and pharynx lesions and Mallapatti (M) score, neck with inspection for jvd and masses and thyroid abnormalities and lymph nodes (supraclavicular and infraclavicular nodes and axillary also examined and noted if abn), chest exam included symmetry and effort and fremitus and percussion and auscultation, cardiac exam included rhythm and gallops and murmur and rubs and jvd and edema, abdominal exam for mass and hepatosplenomegaly and tenderness and hernias and bowel sounds, Musculoskeletal exam with muscle tone and posture and mobility/gait and  strength, and skin for rashes and cyanosis and pallor and turgor, extremity for clubbing.  Findings were normal except for pertinent findings listed below:  M4, morbid, chest is symmetric, no distress, normal percussion, normal fremitus and diffuse mild wheezes  +1-2 tibial edema           Radiographs (ct chest and cxr) reviewed: view by direct vision      Labs reviewed           PFT results reviewed   Pft 2019 fev1 46% wtijh dlco 26% 9% bd  Pft 2018 fev1 56% with dlco 46% and bd 13%  Plan:  Clinical impression is apparently straight forward and impression with management as below.    Jordy"Al" was seen today for copd.    Diagnoses and all orders for this visit:    Severe persistent asthma without complication  -     HOME HEALTH ORDERS  -     CBC auto differential; Future  -     CBC auto differential    Chronic obstructive pulmonary disease, unspecified COPD type  -     CT Chest Without Contrast; Future    Asthma-COPD overlap syndrome  -     HOME HEALTH ORDERS  -     CBC auto differential; Future  -     CBC auto " differential    Morbid obesity with body mass index of 40.0-44.9 in adult    SYBIL (obstructive sleep apnea)    At high risk for falls    Arthritis of right knee    Bronchitis, chronic, mucopurulent  -     Culture, Respiratory with Gram Stain; Future  -     CBC auto differential; Future  -     ciprofloxacin HCl (CIPRO) 500 MG tablet; Take 1 tablet (500 mg total) by mouth 2 (two) times daily.    Dyspnea, unspecified type  -     CT Chest Without Contrast; Future          No follow-ups on file.    Discussed with patient above for education the following:      Patient Instructions   Breathing improved well--- but still too short breath (right knee pain/mobility also limits) to go to bathroom.  Would recommend  home physical therapy.    Weight loss ongoing with ozempic    Use prednisone as needed up to one daily for 3 days weekly if needed..    Breztri to be used.    Would consider shots next visit.  Bring in blood work - off prednisone for 7 days -- next visit.      Continue oxygen and cpap.        Consider more therapy to improve knee as breathing and weight allow...    Check blood count today, check sputum culture as failed doxycycline, take course cipro.....      Would like to see ct chest.              [1]   Social History  Tobacco Use    Smoking status: Former     Current packs/day: 0.00     Types: Cigarettes     Quit date:      Years since quittin.5    Smokeless tobacco: Former     Quit date:    Substance Use Topics    Alcohol use: Not Currently    Drug use: No

## 2025-07-17 NOTE — PATIENT INSTRUCTIONS
Breathing improved well--- but still too short breath (right knee pain/mobility also limits) to go to bathroom.  Would recommend  home physical therapy.    Weight loss ongoing with ozempic    Use prednisone as needed up to one daily for 3 days weekly if needed..    Breztri to be used.    Would consider shots next visit.  Bring in blood work - off prednisone for 7 days -- next visit.      Continue oxygen and cpap.        Consider more therapy to improve knee as breathing and weight allow...    Check blood count today, check sputum culture as failed doxycycline, take course cipro.....      Would like to see ct chest.

## 2025-07-24 ENCOUNTER — HOSPITAL ENCOUNTER (OUTPATIENT)
Dept: RADIOLOGY | Facility: HOSPITAL | Age: 80
Discharge: HOME | End: 2025-07-24
Attending: INTERNAL MEDICINE
Payer: OTHER GOVERNMENT

## 2025-07-24 DIAGNOSIS — R06.00 DYSPNEA, UNSPECIFIED TYPE: ICD-10-CM

## 2025-07-24 DIAGNOSIS — J44.9 CHRONIC OBSTRUCTIVE PULMONARY DISEASE, UNSPECIFIED COPD TYPE: ICD-10-CM

## 2025-07-24 PROCEDURE — 71250 CT THORAX DX C-: CPT | Mod: TC,PO

## 2025-07-24 PROCEDURE — 71250 CT THORAX DX C-: CPT | Mod: 26,,, | Performed by: RADIOLOGY

## 2025-07-25 ENCOUNTER — TELEPHONE (OUTPATIENT)
Dept: PULMONOLOGY | Facility: CLINIC | Age: 80
End: 2025-07-25
Payer: OTHER GOVERNMENT